# Patient Record
Sex: MALE | Race: OTHER | Employment: OTHER | URBAN - METROPOLITAN AREA
[De-identification: names, ages, dates, MRNs, and addresses within clinical notes are randomized per-mention and may not be internally consistent; named-entity substitution may affect disease eponyms.]

---

## 2017-03-06 ENCOUNTER — OFFICE VISIT (OUTPATIENT)
Dept: OTOLARYNGOLOGY | Facility: CLINIC | Age: 62
End: 2017-03-06

## 2017-03-06 ENCOUNTER — OFFICE VISIT (OUTPATIENT)
Dept: FAMILY MEDICINE CLINIC | Facility: CLINIC | Age: 62
End: 2017-03-06

## 2017-03-06 VITALS
DIASTOLIC BLOOD PRESSURE: 90 MMHG | SYSTOLIC BLOOD PRESSURE: 149 MMHG | RESPIRATION RATE: 16 BRPM | TEMPERATURE: 98 F | BODY MASS INDEX: 33 KG/M2 | HEART RATE: 69 BPM | WEIGHT: 195 LBS

## 2017-03-06 VITALS
BODY MASS INDEX: 33.29 KG/M2 | TEMPERATURE: 98 F | SYSTOLIC BLOOD PRESSURE: 152 MMHG | DIASTOLIC BLOOD PRESSURE: 96 MMHG | HEIGHT: 64 IN | HEART RATE: 70 BPM | WEIGHT: 195 LBS

## 2017-03-06 DIAGNOSIS — E78.5 HYPERLIPIDEMIA, UNSPECIFIED HYPERLIPIDEMIA TYPE: ICD-10-CM

## 2017-03-06 DIAGNOSIS — IMO0002 CYST OF NECK: Primary | ICD-10-CM

## 2017-03-06 DIAGNOSIS — R03.0 ELEVATED BLOOD PRESSURE READING: ICD-10-CM

## 2017-03-06 DIAGNOSIS — L02.11 ABSCESS, NECK: Primary | ICD-10-CM

## 2017-03-06 PROCEDURE — 99212 OFFICE O/P EST SF 10 MIN: CPT | Performed by: FAMILY MEDICINE

## 2017-03-06 PROCEDURE — 99212 OFFICE O/P EST SF 10 MIN: CPT | Performed by: OTOLARYNGOLOGY

## 2017-03-06 PROCEDURE — 99213 OFFICE O/P EST LOW 20 MIN: CPT | Performed by: FAMILY MEDICINE

## 2017-03-06 PROCEDURE — 99244 OFF/OP CNSLTJ NEW/EST MOD 40: CPT | Performed by: OTOLARYNGOLOGY

## 2017-03-06 RX ORDER — SIMVASTATIN 10 MG
10 TABLET ORAL NIGHTLY
Qty: 90 TABLET | Refills: 0 | Status: SHIPPED | OUTPATIENT
Start: 2017-03-06 | End: 2018-09-05

## 2017-03-06 RX ORDER — CLINDAMYCIN HYDROCHLORIDE 300 MG/1
300 CAPSULE ORAL 3 TIMES DAILY
Qty: 21 CAPSULE | Refills: 0 | Status: SHIPPED | OUTPATIENT
Start: 2017-03-06 | End: 2017-03-13

## 2017-03-06 NOTE — PROGRESS NOTES
HPI:Jose Rafael is a 59year old male who presents for lesion noted to right neck. Pt states he has had white lesion there for the past 2 years. On Friday, he felt pain in the area. Worsened over the weekend and size increased. No fevers.   No drainage from area

## 2017-03-10 ENCOUNTER — TELEPHONE (OUTPATIENT)
Dept: OTOLARYNGOLOGY | Facility: CLINIC | Age: 62
End: 2017-03-10

## 2017-03-10 NOTE — TELEPHONE ENCOUNTER
Pt's wife states that pt is experiencing pain. (pain level 8/10) on ear cyst and it is getting bigger and turning darker, \"blackish color\" pt requesting to spk to Rn right away. Please call, thank you. LOV: 03/06 ( unable to connect) pharmacy confirmed.

## 2017-03-10 NOTE — TELEPHONE ENCOUNTER
Per , may take time for antibiotic to penetrate cyst, pt to use warm compresses, cant take OTC tylenol, no NSAID or aspirin as pt is scheduled for surgery in 10 days.  Pt informed of  recommendations and advised to contact our office if symp

## 2017-03-12 ENCOUNTER — HOSPITAL ENCOUNTER (EMERGENCY)
Facility: HOSPITAL | Age: 62
Discharge: HOME | End: 2017-03-12
Attending: EMERGENCY MEDICINE
Payer: COMMERCIAL

## 2017-03-12 VITALS
TEMPERATURE: 98 F | WEIGHT: 190 LBS | SYSTOLIC BLOOD PRESSURE: 156 MMHG | OXYGEN SATURATION: 98 % | DIASTOLIC BLOOD PRESSURE: 105 MMHG | HEART RATE: 76 BPM | HEIGHT: 64 IN | RESPIRATION RATE: 18 BRPM | BODY MASS INDEX: 32.44 KG/M2

## 2017-03-12 DIAGNOSIS — L02.91 ABSCESS: Primary | ICD-10-CM

## 2017-03-12 PROCEDURE — 99283 EMERGENCY DEPT VISIT LOW MDM: CPT

## 2017-03-12 PROCEDURE — 10061 I&D ABSCESS COMP/MULTIPLE: CPT

## 2017-03-12 RX ORDER — HYDROCODONE BITARTRATE AND ACETAMINOPHEN 5; 325 MG/1; MG/1
2 TABLET ORAL ONCE
Status: COMPLETED | OUTPATIENT
Start: 2017-03-12 | End: 2017-03-12

## 2017-03-12 RX ORDER — SULFAMETHOXAZOLE AND TRIMETHOPRIM 800; 160 MG/1; MG/1
1 TABLET ORAL 2 TIMES DAILY
Qty: 20 TABLET | Refills: 0 | Status: SHIPPED | OUTPATIENT
Start: 2017-03-12 | End: 2017-03-22

## 2017-03-13 NOTE — ED NOTES
Patient to [facility] from triage c/o cyst to right side of neck x3wks. Scheduled for surgery on 3/22 with Dr. Levine April, but instructed to come to ED if he notices drainage, which began today. Rates pain 9/10. Denies additional complaints.   In NAD at this mark anthony

## 2017-03-13 NOTE — ED PROVIDER NOTES
Patient Seen in: [facility] Emergency Department    History   Patient presents with:  Cyst    Stated Complaint: cyst on right side of neck    HPI    59year old male 59year old Male with painful swelling of right facial cyst for 2 day(s).  has assoc Constitutional and vital signs reviewed. All other systems reviewed and negative except as noted above. PSFH elements reviewed from today and agreed except as otherwise stated in HPI.     Physical Exam     ED Triage Vitals   BP 03/12/17 2234 148/89 Imaging Results Available and Reviewed while in ED: No orders to display    ED Medications Administered: Medications - No data to display      Procedures: Incision and Drainage: The abscess was anesthetized with lidocaine.  A transverse incision was perform Plan: incised, drained and packed. Warm water soaks. Will add bactrim. F.u ent 2 days for wound check and packing removal.  Further Outpatient evaluation and treatment will be required.  I personally discussed the results of the above ED workup and a number

## 2017-03-13 NOTE — ED INITIAL ASSESSMENT (HPI)
Pt c/o cyst to right side of neck. Seen at pmd for same, told to return with drainage, which started today. Informed he would likely need sx to site.

## 2017-03-14 ENCOUNTER — OFFICE VISIT (OUTPATIENT)
Dept: OTOLARYNGOLOGY | Facility: CLINIC | Age: 62
End: 2017-03-14

## 2017-03-14 VITALS — SYSTOLIC BLOOD PRESSURE: 142 MMHG | HEART RATE: 74 BPM | DIASTOLIC BLOOD PRESSURE: 90 MMHG

## 2017-03-14 DIAGNOSIS — IMO0002 CYST OF NECK: Primary | ICD-10-CM

## 2017-03-14 PROCEDURE — 99212 OFFICE O/P EST SF 10 MIN: CPT | Performed by: OTOLARYNGOLOGY

## 2017-03-14 PROCEDURE — 99214 OFFICE O/P EST MOD 30 MIN: CPT | Performed by: OTOLARYNGOLOGY

## 2017-03-14 NOTE — PROGRESS NOTES
Trudy Bangura is a 59year old male.   Patient presents with:  Cyst: f/u neck cyst,pt seen in [address] [place] [address] ED , cyst was draining, pt states was advised by ED to follow up as it may need to be drained again, pt prescribed different antibiiotic       HISTORY OF PRESE History    TONSILLECTOMY  1971    REPAIR ROTATOR CUFF,CHRONIC Right 2011    Comment rotator cuff repair. 1999 rotator cuff tear (right).   Manuel Salvador         REVIEW OF SYSTEMS    System Neg/Pos Details   Constitutional Negative Fatigue, fever and weight loss Lips/teeth/gums - Normal. Tonsils - Normal. Oropharynx - Normal.   Nose/Mouth/Throat Normal Nares - Right: Normal Left: Normal. Septum -Normal  Turbinates - Right: Normal, Left: Normal.       Current outpatient prescriptions:   •  Sulfamethoxazole-TMP DS 8

## 2017-03-22 ENCOUNTER — LAB REQUISITION (OUTPATIENT)
Dept: LAB | Facility: HOSPITAL | Age: 62
End: 2017-03-22
Payer: COMMERCIAL

## 2017-03-22 DIAGNOSIS — Z01.89 ENCOUNTER FOR OTHER SPECIFIED SPECIAL EXAMINATIONS: ICD-10-CM

## 2017-03-22 PROCEDURE — 88305 TISSUE EXAM BY PATHOLOGIST: CPT | Performed by: OTOLARYNGOLOGY

## 2017-03-22 PROCEDURE — 88304 TISSUE EXAM BY PATHOLOGIST: CPT | Performed by: OTOLARYNGOLOGY

## 2017-03-23 ENCOUNTER — TELEPHONE (OUTPATIENT)
Dept: OTOLARYNGOLOGY | Facility: CLINIC | Age: 62
End: 2017-03-23

## 2017-03-23 NOTE — TELEPHONE ENCOUNTER
Pt is post op day 1 right neck excision and closure. Per pt is doing well, dressing is dry and intact and pt taking antibiotic as prescribed and per pt no pain.  Advised pt that if symptoms change or worsen such as drainage, increased redness or swelling or

## 2017-03-30 ENCOUNTER — OFFICE VISIT (OUTPATIENT)
Dept: OTOLARYNGOLOGY | Facility: CLINIC | Age: 62
End: 2017-03-30

## 2017-03-30 VITALS — WEIGHT: 185 LBS | TEMPERATURE: 98 F | HEIGHT: 64 IN | BODY MASS INDEX: 31.58 KG/M2

## 2017-03-30 DIAGNOSIS — IMO0002 CYST OF NECK: Primary | ICD-10-CM

## 2017-03-30 PROCEDURE — 99024 POSTOP FOLLOW-UP VISIT: CPT | Performed by: OTOLARYNGOLOGY

## 2017-03-30 PROCEDURE — 99212 OFFICE O/P EST SF 10 MIN: CPT | Performed by: OTOLARYNGOLOGY

## 2017-03-30 NOTE — PROGRESS NOTES
Chris Jefferson is a 59year old male. Patient presents with:  Post-Op      HISTORY OF PRESENT ILLNESS    2 year history of enlarging right upper neck cyst that has grown tremendously in the past few weeks with associated pain and and erythema.  No drainage of neck      3/22/17 Right Neck Cyst           Past Surgical History    TONSILLECTOMY  1971    REPAIR ROTATOR CUFF,CHRONIC Right 2011    Comment rotator cuff repair. 1999 rotator cuff tear (right).   Leanna Sero    EXC SKIN BENIG 2.1-3CM REMAINDR BODY Right 3 Detail Normal Submental. Submandibular. Anterior cervical. Posterior cervical. Supraclavicular.         Nose/Mouth/Throat Normal External nose - Normal. Lips/teeth/gums - Normal. Tonsils - Normal. Oropharynx - Normal.   Nose/Mouth/Throat Normal Nares - Righ

## 2018-07-23 ENCOUNTER — TELEPHONE (OUTPATIENT)
Dept: OTHER | Age: 63
End: 2018-07-23

## 2018-07-23 NOTE — TELEPHONE ENCOUNTER
Spouse would like to schedule appt for pt as he has been having mood swings, repeating conversations, forgetfulness. Concerned because pt has family history of brain hemorrhages. Pt is currently visiting family in [place] and returning 8/1/18.  Spouse

## 2018-08-02 ENCOUNTER — OFFICE VISIT (OUTPATIENT)
Dept: FAMILY MEDICINE CLINIC | Facility: CLINIC | Age: 63
End: 2018-08-02
Payer: MEDICARE

## 2018-08-02 ENCOUNTER — HOSPITAL ENCOUNTER (OUTPATIENT)
Dept: GENERAL RADIOLOGY | Age: 63
Discharge: HOME | End: 2018-08-02
Attending: FAMILY MEDICINE | Admitting: FAMILY MEDICINE
Payer: COMMERCIAL

## 2018-08-02 VITALS
DIASTOLIC BLOOD PRESSURE: 93 MMHG | TEMPERATURE: 97 F | WEIGHT: 194 LBS | BODY MASS INDEX: 33 KG/M2 | RESPIRATION RATE: 18 BRPM | SYSTOLIC BLOOD PRESSURE: 157 MMHG | HEART RATE: 71 BPM

## 2018-08-02 DIAGNOSIS — R05.9 COUGH: ICD-10-CM

## 2018-08-02 DIAGNOSIS — Z00.00 ROUTINE PHYSICAL EXAMINATION: ICD-10-CM

## 2018-08-02 DIAGNOSIS — R05.9 COUGH: Primary | ICD-10-CM

## 2018-08-02 DIAGNOSIS — R41.3 MEMORY LOSS: ICD-10-CM

## 2018-08-02 DIAGNOSIS — Z82.49 FAMILY HISTORY OF ANEURYSM: ICD-10-CM

## 2018-08-02 PROCEDURE — G0463 HOSPITAL OUTPT CLINIC VISIT: HCPCS | Performed by: FAMILY MEDICINE

## 2018-08-02 PROCEDURE — 71046 X-RAY EXAM CHEST 2 VIEWS: CPT | Performed by: FAMILY MEDICINE

## 2018-08-02 PROCEDURE — 99214 OFFICE O/P EST MOD 30 MIN: CPT | Performed by: FAMILY MEDICINE

## 2018-08-06 ENCOUNTER — TELEPHONE (OUTPATIENT)
Dept: OTHER | Age: 63
End: 2018-08-06

## 2018-08-06 NOTE — TELEPHONE ENCOUNTER
Frederic Reyes from [facility] care transfered wife's call to triage dept due to wife statement of pts' mental status. MRI not completed because clinical note not complete. Wife hung up call before the transfer.      I called pt directly, stated no issues besides the

## 2018-08-09 ENCOUNTER — TELEPHONE (OUTPATIENT)
Dept: FAMILY MEDICINE CLINIC | Facility: CLINIC | Age: 63
End: 2018-08-09

## 2018-08-09 NOTE — TELEPHONE ENCOUNTER
Spoke with Parminder Pina, only MRA is not passing Medicare coding. CMW please advise if there is something besides memory loss and family hx of aneurysm. Or would you like to proceed with MRI first to see if there is any known diagnosis.

## 2018-08-09 NOTE — TELEPHONE ENCOUNTER
[facility] Scheduling stts MRA Brain DX is not passing for medicare. Please advise on other DX code.

## 2018-08-10 NOTE — TELEPHONE ENCOUNTER
I would definitely proceed with the MRI. I am not sure why the MRA isn't passing because he has a significant family history of aneurysm. Could I do a peer to peer?

## 2018-08-11 NOTE — TELEPHONE ENCOUNTER
This message came from scheduling using Medicare guidelines. Medicare is listed as secondary policy, could request PA from [employer] PPO. LMTCB to clarify with pt which policy is primary.

## 2018-08-11 NOTE — TELEPHONE ENCOUNTER
Pt called back stating that the BC[employer] PPO is the primary insurance through his wife's company. Please advise.

## 2018-08-20 ENCOUNTER — HOSPITAL ENCOUNTER (OUTPATIENT)
Dept: MRI IMAGING | Facility: HOSPITAL | Age: 63
Discharge: HOME | End: 2018-08-20
Attending: FAMILY MEDICINE
Payer: COMMERCIAL

## 2018-08-20 ENCOUNTER — TELEPHONE (OUTPATIENT)
Dept: FAMILY MEDICINE CLINIC | Facility: CLINIC | Age: 63
End: 2018-08-20

## 2018-08-20 DIAGNOSIS — Z82.49 FAMILY HISTORY OF ANEURYSM: ICD-10-CM

## 2018-08-20 DIAGNOSIS — R41.3 MEMORY LOSS: ICD-10-CM

## 2018-08-20 PROCEDURE — 70551 MRI BRAIN STEM W/O DYE: CPT | Performed by: FAMILY MEDICINE

## 2018-08-20 PROCEDURE — 70544 MR ANGIOGRAPHY HEAD W/O DYE: CPT | Performed by: FAMILY MEDICINE

## 2018-08-20 NOTE — TELEPHONE ENCOUNTER
Savita/Gary Atkins called in stating that Pt is there to be seen for his MRI/MRA. While she runs Pt's insurance the MRA is not approving. She is asking for a new order with a DX code that would be approved through Medicare as secondary. Please advise.

## 2018-08-22 ENCOUNTER — PATIENT MESSAGE (OUTPATIENT)
Dept: FAMILY MEDICINE CLINIC | Facility: CLINIC | Age: 63
End: 2018-08-22

## 2018-08-23 ENCOUNTER — TELEPHONE (OUTPATIENT)
Dept: FAMILY MEDICINE CLINIC | Facility: CLINIC | Age: 63
End: 2018-08-23

## 2018-08-23 NOTE — TELEPHONE ENCOUNTER
From: Iam Rizzo  To: Li Perez DO  Sent: 8/22/2018 10:31 AM CDT  Subject: Test Results Question    Good morning Dr Rakesh Henderson   I was just wondering if you have received the MRI/MRA results. Thanks, have a great day.   Dylan Chew

## 2018-08-24 DIAGNOSIS — R41.3 MEMORY CHANGES: Primary | ICD-10-CM

## 2018-08-24 DIAGNOSIS — R90.89 ABNORMAL MRA, BRAIN: ICD-10-CM

## 2018-08-24 NOTE — TELEPHONE ENCOUNTER
Spoke with patient who is requesting to know the results for the MRI performed on 8/20/18. Message routed to covering provider for review.     Please reply to meryl: BRIANNA Hernandez

## 2018-08-28 ENCOUNTER — APPOINTMENT (OUTPATIENT)
Dept: LAB | Age: 63
End: 2018-08-28
Attending: FAMILY MEDICINE
Payer: MEDICARE

## 2018-08-28 DIAGNOSIS — Z00.00 ROUTINE PHYSICAL EXAMINATION: ICD-10-CM

## 2018-08-28 LAB
ALBUMIN SERPL BCP-MCNC: 4.1 G/DL (ref 3.5–4.8)
ALBUMIN/GLOB SERPL: 1.5 {RATIO} (ref 1–2)
ALP SERPL-CCNC: 42 U/L (ref 32–100)
ALT SERPL-CCNC: 24 U/L (ref 17–63)
ANION GAP SERPL CALC-SCNC: 7 MMOL/L (ref 0–18)
AST SERPL-CCNC: 20 U/L (ref 15–41)
BILIRUB SERPL-MCNC: 1.1 MG/DL (ref 0.3–1.2)
BUN SERPL-MCNC: 16 MG/DL (ref 8–20)
BUN/CREAT SERPL: 14.5 (ref 10–20)
CALCIUM SERPL-MCNC: 8.8 MG/DL (ref 8.5–10.5)
CHLORIDE SERPL-SCNC: 104 MMOL/L (ref 95–110)
CHOLEST SERPL-MCNC: 226 MG/DL (ref 110–200)
CO2 SERPL-SCNC: 26 MMOL/L (ref 22–32)
CREAT SERPL-MCNC: 1.1 MG/DL (ref 0.5–1.5)
ERYTHROCYTE [DISTWIDTH] IN BLOOD BY AUTOMATED COUNT: 13.2 % (ref 11–15)
GLOBULIN PLAS-MCNC: 2.7 G/DL (ref 2.5–3.7)
GLUCOSE SERPL-MCNC: 99 MG/DL (ref 70–99)
HCT VFR BLD AUTO: 47.2 % (ref 41–52)
HDLC SERPL-MCNC: 40 MG/DL
HGB BLD-MCNC: 15.6 G/DL (ref 13.5–17.5)
LDLC SERPL CALC-MCNC: 155 MG/DL (ref 0–99)
MCH RBC QN AUTO: 30.6 PG (ref 27–32)
MCHC RBC AUTO-ENTMCNC: 33.1 G/DL (ref 32–37)
MCV RBC AUTO: 92.5 FL (ref 80–100)
NONHDLC SERPL-MCNC: 186 MG/DL
OSMOLALITY UR CALC.SUM OF ELEC: 285 MOSM/KG (ref 275–295)
PATIENT FASTING: YES
PLATELET # BLD AUTO: 166 K/UL (ref 140–400)
PMV BLD AUTO: 9.1 FL (ref 7.4–10.3)
POTASSIUM SERPL-SCNC: 3.5 MMOL/L (ref 3.3–5.1)
PROT SERPL-MCNC: 6.8 G/DL (ref 5.9–8.4)
PSA SERPL-MCNC: 0.5 NG/ML (ref 0–4)
RBC # BLD AUTO: 5.1 M/UL (ref 4.5–5.9)
SODIUM SERPL-SCNC: 137 MMOL/L (ref 136–144)
TRIGL SERPL-MCNC: 154 MG/DL (ref 1–149)
TSH SERPL-ACNC: 2.61 UIU/ML (ref 0.45–5.33)
WBC # BLD AUTO: 5.7 K/UL (ref 4–11)

## 2018-08-28 PROCEDURE — 84443 ASSAY THYROID STIM HORMONE: CPT

## 2018-08-28 PROCEDURE — 36415 COLL VENOUS BLD VENIPUNCTURE: CPT

## 2018-08-28 PROCEDURE — 85027 COMPLETE CBC AUTOMATED: CPT

## 2018-08-28 PROCEDURE — 82306 VITAMIN D 25 HYDROXY: CPT

## 2018-08-28 PROCEDURE — 80061 LIPID PANEL: CPT

## 2018-08-28 PROCEDURE — 80053 COMPREHEN METABOLIC PANEL: CPT

## 2018-08-29 LAB — 25(OH)D3 SERPL-MCNC: 23.7 NG/ML

## 2018-08-31 DIAGNOSIS — E55.9 VITAMIN D DEFICIENCY: Primary | ICD-10-CM

## 2018-08-31 RX ORDER — ERGOCALCIFEROL 1.25 MG/1
50000 CAPSULE ORAL WEEKLY
Qty: 8 CAPSULE | Refills: 0 | Status: SHIPPED | OUTPATIENT
Start: 2018-08-31 | End: 2018-09-30

## 2018-09-05 ENCOUNTER — OFFICE VISIT (OUTPATIENT)
Dept: FAMILY MEDICINE CLINIC | Facility: CLINIC | Age: 63
End: 2018-09-05
Payer: COMMERCIAL

## 2018-09-05 VITALS
HEART RATE: 88 BPM | DIASTOLIC BLOOD PRESSURE: 94 MMHG | TEMPERATURE: 98 F | WEIGHT: 186 LBS | HEIGHT: 63 IN | BODY MASS INDEX: 32.96 KG/M2 | RESPIRATION RATE: 18 BRPM | SYSTOLIC BLOOD PRESSURE: 156 MMHG

## 2018-09-05 DIAGNOSIS — E55.9 VITAMIN D DEFICIENCY: ICD-10-CM

## 2018-09-05 DIAGNOSIS — R41.3 MEMORY LOSS: ICD-10-CM

## 2018-09-05 DIAGNOSIS — R05.9 COUGH: ICD-10-CM

## 2018-09-05 DIAGNOSIS — E78.5 HYPERLIPIDEMIA, UNSPECIFIED HYPERLIPIDEMIA TYPE: ICD-10-CM

## 2018-09-05 DIAGNOSIS — Z00.00 ROUTINE PHYSICAL EXAMINATION: Primary | ICD-10-CM

## 2018-09-05 PROCEDURE — 99397 PER PM REEVAL EST PAT 65+ YR: CPT | Performed by: FAMILY MEDICINE

## 2018-09-05 RX ORDER — SIMVASTATIN 20 MG
20 TABLET ORAL NIGHTLY
Qty: 90 TABLET | Refills: 1 | Status: SHIPPED | OUTPATIENT
Start: 2018-09-05 | End: 2019-01-21

## 2018-09-05 RX ORDER — ALBUTEROL SULFATE 90 UG/1
2 AEROSOL, METERED RESPIRATORY (INHALATION) EVERY 4 HOURS PRN
Qty: 1 INHALER | Refills: 0 | Status: SHIPPED | OUTPATIENT
Start: 2018-09-05 | End: 2019-09-25

## 2018-09-05 NOTE — PROGRESS NOTES
HPI:  70 yr old male who presents for physical. Has appt with Neurologist on 9/18. States symptoms are fine but wife complains he is still experiencing memory loss and mood changes. BP elevated today. States it is much lower at home. SBP 120s/80s.  L Neck supple. Good ROM. No LAD.   Thyroid normal.  CV:  Regular rate and rhythm; no murmurs  Lungs:  Clear to ausculation; good aeration               No wheezes, rales or rhonchi  Abd: soft, non-tender, non-distended          Normal bowel sounds; no mass

## 2018-09-18 ENCOUNTER — TELEPHONE (OUTPATIENT)
Dept: NEUROLOGY | Facility: CLINIC | Age: 63
End: 2018-09-18

## 2018-09-18 ENCOUNTER — APPOINTMENT (OUTPATIENT)
Dept: LAB | Facility: HOSPITAL | Age: 63
End: 2018-09-18
Attending: Other
Payer: COMMERCIAL

## 2018-09-18 ENCOUNTER — MED REC SCAN ONLY (OUTPATIENT)
Dept: NEUROLOGY | Facility: CLINIC | Age: 63
End: 2018-09-18

## 2018-09-18 ENCOUNTER — OFFICE VISIT (OUTPATIENT)
Dept: NEUROLOGY | Facility: CLINIC | Age: 63
End: 2018-09-18
Payer: COMMERCIAL

## 2018-09-18 VITALS
HEIGHT: 64 IN | HEART RATE: 74 BPM | DIASTOLIC BLOOD PRESSURE: 80 MMHG | WEIGHT: 182 LBS | BODY MASS INDEX: 31.07 KG/M2 | SYSTOLIC BLOOD PRESSURE: 120 MMHG

## 2018-09-18 DIAGNOSIS — R41.3 MEMORY LOSS: Primary | ICD-10-CM

## 2018-09-18 DIAGNOSIS — R41.3 MEMORY LOSS: ICD-10-CM

## 2018-09-18 LAB
FOLATE SERPL-MCNC: 6.2 NG/ML
TSH SERPL-ACNC: 2.84 UIU/ML (ref 0.45–5.33)
VIT B12 SERPL-MCNC: 245 PG/ML (ref 181–914)

## 2018-09-18 PROCEDURE — 82746 ASSAY OF FOLIC ACID SERUM: CPT

## 2018-09-18 PROCEDURE — 82607 VITAMIN B-12: CPT

## 2018-09-18 PROCEDURE — 86780 TREPONEMA PALLIDUM: CPT

## 2018-09-18 PROCEDURE — 84443 ASSAY THYROID STIM HORMONE: CPT

## 2018-09-18 PROCEDURE — 36415 COLL VENOUS BLD VENIPUNCTURE: CPT

## 2018-09-18 PROCEDURE — 87389 HIV-1 AG W/HIV-1&-2 AB AG IA: CPT

## 2018-09-18 PROCEDURE — 99204 OFFICE O/P NEW MOD 45 MIN: CPT | Performed by: OTHER

## 2018-09-18 NOTE — PROGRESS NOTES
Neurology Initial Visit     Referred By: Dr. Hwang ref. provider found    Chief Complaint: Patient presents with:  Neurologic Problem: referred by Dr Bharath Somers pt is here for consult on repetitive went talking for 3 months.  Had MRI and MRA brain 8-20-18 result occasionally       Drug use: No       Family History   Problem Relation Age of Onset   • Other (brain aneurysm) Father 49        47 onset (cause of death)   • Colon Cancer Cousin 64        onset 64 (cause of death)   • Heart Disease Mother 72        CAD, C symmetric, no facial weakness  VIII. Hearing intact to whisper, tuning fork or finger rub. IX. Pallet elevates symmetrically. XI. Shoulder shrug is intact  XII.  Tongue is midline    Motor Exam:  Muscle tone normal  No atrophy or fasciculations  Strength- EXTERNAL  - VITAMIN B12; Future  - TSH W REFLEX TO FREE T4; Future  - T PALLIDUM SCREENING CASCADE; Future           Education and counseling provided to patient.  Instructed patient to call my office or seek medical attention immediately if symptoms worsen

## 2018-09-18 NOTE — TELEPHONE ENCOUNTER
L/m advising Pt. insurance was verified and Neuropsych testing cpt code 85925  is a covered benefit and does not require authorization. Can proceed with scheduling appt.

## 2018-09-19 ENCOUNTER — TELEPHONE (OUTPATIENT)
Dept: NEUROLOGY | Facility: CLINIC | Age: 63
End: 2018-09-19

## 2018-09-19 LAB
HIV1+2 AB SERPL QL IA: NONREACTIVE
T PALLIDUM AB SER QL: NEGATIVE

## 2018-09-19 NOTE — TELEPHONE ENCOUNTER
----- Message from Veronica Mai MD sent at 9/19/2018  8:12 AM CDT -----  Please let the patient know that results of these particular lab tests so far were normal.    Thank you

## 2018-09-19 NOTE — TELEPHONE ENCOUNTER
----- Message from Danica Corey MD sent at 9/19/2018  8:12 AM CDT -----  Please let the patient know that results of these particular lab tests so far were normal.    Thank you

## 2018-10-10 ENCOUNTER — TELEPHONE (OUTPATIENT)
Dept: NEUROLOGY | Facility: CLINIC | Age: 63
End: 2018-10-10

## 2018-10-10 ENCOUNTER — OFFICE VISIT (OUTPATIENT)
Dept: NEUROLOGY | Facility: CLINIC | Age: 63
End: 2018-10-10
Payer: COMMERCIAL

## 2018-10-10 VITALS
BODY MASS INDEX: 30.56 KG/M2 | WEIGHT: 179 LBS | HEIGHT: 64 IN | DIASTOLIC BLOOD PRESSURE: 88 MMHG | SYSTOLIC BLOOD PRESSURE: 142 MMHG | HEART RATE: 90 BPM

## 2018-10-10 DIAGNOSIS — R41.3 MEMORY LOSS: Primary | ICD-10-CM

## 2018-10-10 PROCEDURE — 99214 OFFICE O/P EST MOD 30 MIN: CPT | Performed by: OTHER

## 2018-10-10 NOTE — PROGRESS NOTES
Neurology Follow up Visit     Referred By: Dr. Hwang ref. provider found    Chief Complaint: Patient presents with:  Memory Loss: LOV 9-18-18 pt is here to f/u on memory loss and neuropshych test result.  Pt states memory have being the same since last offic rotator cuff 1999    rotator cuff tear (right). 2011 Rotator cuff repair       Past Surgical History:   Procedure Laterality Date   • EXC SKIN BENIG 2.1-3CM REMAINDR BODY Right 3/22/17   • REPAIR ROTATOR CUFF,CHRONIC Right 2011    rotator cuff repair.   21 age    Language intact including: comprehension, naming, repetition, vocabulary    MoCA was done and was 23/30 and showed deficiencies in delayed recall.       Cranial Nerves:  II.- Visual fields full to confrontation        Fundoscopically- No papilledema 1. 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   No obvious memory loss itself, which makes Alzheimer's disease unlikely, but still some possibility of frontotemp

## 2018-10-11 ENCOUNTER — MED REC SCAN ONLY (OUTPATIENT)
Dept: NEUROLOGY | Facility: CLINIC | Age: 63
End: 2018-10-11

## 2019-01-16 ENCOUNTER — NURSE TRIAGE (OUTPATIENT)
Dept: OTHER | Age: 64
End: 2019-01-16

## 2019-01-16 NOTE — TELEPHONE ENCOUNTER
Reviewed doctor's recommendations with pt, does not want to go to ER.  States he will keep appt as scheduled Monday 1/21/19

## 2019-01-16 NOTE — TELEPHONE ENCOUNTER
Action Requested: Summary for Provider     []  Critical Lab, Recommendations Needed  [] Need Additional Advice  []   FYI    []   Need Orders  [] Need Medications Sent to Pharmacy  []  Other     SUMMARY: Dr Tonia Sánchez, please advise.  Per protocol, advised to go

## 2019-01-21 ENCOUNTER — OFFICE VISIT (OUTPATIENT)
Dept: FAMILY MEDICINE CLINIC | Facility: CLINIC | Age: 64
End: 2019-01-21
Payer: COMMERCIAL

## 2019-01-21 VITALS
HEART RATE: 71 BPM | SYSTOLIC BLOOD PRESSURE: 153 MMHG | OXYGEN SATURATION: 98 % | BODY MASS INDEX: 32 KG/M2 | WEIGHT: 189 LBS | RESPIRATION RATE: 18 BRPM | DIASTOLIC BLOOD PRESSURE: 91 MMHG | TEMPERATURE: 97 F

## 2019-01-21 DIAGNOSIS — E78.5 HYPERLIPIDEMIA, UNSPECIFIED HYPERLIPIDEMIA TYPE: ICD-10-CM

## 2019-01-21 DIAGNOSIS — R07.89 CHEST TIGHTNESS OR PRESSURE: Primary | ICD-10-CM

## 2019-01-21 DIAGNOSIS — I10 ESSENTIAL HYPERTENSION: ICD-10-CM

## 2019-01-21 PROCEDURE — 99214 OFFICE O/P EST MOD 30 MIN: CPT | Performed by: FAMILY MEDICINE

## 2019-01-21 PROCEDURE — 99212 OFFICE O/P EST SF 10 MIN: CPT | Performed by: FAMILY MEDICINE

## 2019-01-21 PROCEDURE — G0463 HOSPITAL OUTPT CLINIC VISIT: HCPCS | Performed by: FAMILY MEDICINE

## 2019-01-21 PROCEDURE — 93005 ELECTROCARDIOGRAM TRACING: CPT | Performed by: FAMILY MEDICINE

## 2019-01-21 PROCEDURE — 93000 ELECTROCARDIOGRAM COMPLETE: CPT | Performed by: FAMILY MEDICINE

## 2019-01-21 RX ORDER — SIMVASTATIN 20 MG
20 TABLET ORAL NIGHTLY
Qty: 90 TABLET | Refills: 1 | Status: SHIPPED | OUTPATIENT
Start: 2019-01-21 | End: 2019-08-19

## 2019-01-21 RX ORDER — HYDROCHLOROTHIAZIDE 12.5 MG/1
12.5 TABLET ORAL DAILY
Qty: 30 TABLET | Refills: 3 | Status: SHIPPED | OUTPATIENT
Start: 2019-01-21 | End: 2019-07-10

## 2019-01-21 NOTE — PROGRESS NOTES
HPI: Sherri Finley is a 70year old male who presents for chest pressure. Intermittent. Happens at rest or activity. No increased pain with activity. Goes to the gym and shoveled snow without worsening symptoms. Lasts seconds. No associated symptoms.   Wife state

## 2019-02-21 ENCOUNTER — OFFICE VISIT (OUTPATIENT)
Dept: FAMILY MEDICINE CLINIC | Facility: CLINIC | Age: 64
End: 2019-02-21
Payer: COMMERCIAL

## 2019-02-21 VITALS
WEIGHT: 192 LBS | HEART RATE: 72 BPM | DIASTOLIC BLOOD PRESSURE: 90 MMHG | OXYGEN SATURATION: 96 % | BODY MASS INDEX: 33 KG/M2 | RESPIRATION RATE: 18 BRPM | SYSTOLIC BLOOD PRESSURE: 152 MMHG | TEMPERATURE: 98 F

## 2019-02-21 DIAGNOSIS — R05.9 COUGH: Primary | ICD-10-CM

## 2019-02-21 DIAGNOSIS — I10 ESSENTIAL HYPERTENSION: ICD-10-CM

## 2019-02-21 PROCEDURE — 99212 OFFICE O/P EST SF 10 MIN: CPT | Performed by: FAMILY MEDICINE

## 2019-02-21 PROCEDURE — 99214 OFFICE O/P EST MOD 30 MIN: CPT | Performed by: FAMILY MEDICINE

## 2019-02-21 PROCEDURE — G0463 HOSPITAL OUTPT CLINIC VISIT: HCPCS | Performed by: FAMILY MEDICINE

## 2019-02-21 NOTE — PROGRESS NOTES
HPI: Justen Kim is a 70year old male who presents for follow-up HTN. States BP is often lower at home. States it never gets as high as in the office. Still having cough and chest congestion.  Has had cough since August.  He states now he has congestion as we

## 2019-03-06 ENCOUNTER — TELEPHONE (OUTPATIENT)
Dept: FAMILY MEDICINE CLINIC | Facility: CLINIC | Age: 64
End: 2019-03-06

## 2019-03-06 NOTE — TELEPHONE ENCOUNTER
Pt states was referred to Dr Mayte Yang by [facility]. States Dr Mayte Yang has no openings available until 4/29/19 and is wondering if can see a different pulmonologist who might be able to go over the PFT results with him before then?     Please respond to pool: BRIANNA  OPO

## 2019-03-13 ENCOUNTER — HOSPITAL ENCOUNTER (OUTPATIENT)
Dept: RESPIRATORY THERAPY | Facility: HOSPITAL | Age: 64
Discharge: HOME | End: 2019-03-13
Attending: FAMILY MEDICINE
Payer: COMMERCIAL

## 2019-03-13 ENCOUNTER — HOSPITAL ENCOUNTER (OUTPATIENT)
Dept: CT IMAGING | Facility: HOSPITAL | Age: 64
Discharge: HOME | End: 2019-03-13
Attending: FAMILY MEDICINE
Payer: COMMERCIAL

## 2019-03-13 DIAGNOSIS — R05.9 COUGH: ICD-10-CM

## 2019-03-13 LAB — CREAT BLD-MCNC: 0.9 MG/DL (ref 0.5–1.5)

## 2019-03-13 PROCEDURE — 71260 CT THORAX DX C+: CPT | Performed by: FAMILY MEDICINE

## 2019-03-13 PROCEDURE — 94726 PLETHYSMOGRAPHY LUNG VOLUMES: CPT | Performed by: INTERNAL MEDICINE

## 2019-03-13 PROCEDURE — 94729 DIFFUSING CAPACITY: CPT | Performed by: INTERNAL MEDICINE

## 2019-03-13 PROCEDURE — 82565 ASSAY OF CREATININE: CPT

## 2019-03-13 PROCEDURE — 94060 EVALUATION OF WHEEZING: CPT | Performed by: INTERNAL MEDICINE

## 2019-03-18 NOTE — PROCEDURES
[facility]    Patient's Name Jaren Castaneda MRN [id number]    1955 Pulmonologist Caryle Parsons, MD   Location [facility] PCP Gavin Norman DO     IMPRESSION:    The PFTs are Normal.    There is no signific

## 2019-03-18 NOTE — ADDENDUM NOTE
Encounter addended by: Kyle Benedict MD on: 3/17/2019 7:38 PM   Actions taken: Sign clinical note, Charge Capture section accepted

## 2019-03-22 ENCOUNTER — TELEPHONE (OUTPATIENT)
Dept: FAMILY MEDICINE CLINIC | Facility: CLINIC | Age: 64
End: 2019-03-22

## 2019-03-22 NOTE — TELEPHONE ENCOUNTER
Pt called in stating that he was advised by the hospital that an MRI of the abdomen was recommended after he received his CT scan. Pt stated that he would like to have this order completed prior to seeing the Specialist that CMW referred him to.    Pt is r

## 2019-03-23 NOTE — TELEPHONE ENCOUNTER
LM - Dr Rodrigo Sandoval is out of the office until April 1. Pt's Numari message has been already forwarded to her. Can pt wait for MRI order until Dr Rodrigo Sandoval comes back?

## 2019-03-24 ENCOUNTER — PATIENT MESSAGE (OUTPATIENT)
Dept: FAMILY MEDICINE CLINIC | Facility: CLINIC | Age: 64
End: 2019-03-24

## 2019-03-25 NOTE — TELEPHONE ENCOUNTER
From: Stephanie Middleton  To: Donavan Knight DO  Sent: 3/24/2019 8:14 PM CDT  Subject: Referral Request    Hi Dr Tiffanie Gaona  I received a message about you being out of this week. If it is ok, can you please order these there upon your return?   Thank you  [facility]

## 2019-04-03 ENCOUNTER — TELEPHONE (OUTPATIENT)
Dept: FAMILY MEDICINE CLINIC | Facility: CLINIC | Age: 64
End: 2019-04-03

## 2019-04-03 NOTE — TELEPHONE ENCOUNTER
Patient called about MRI abdomen, need to see if insurance had approved. Advised patient to call his insurance about its authorization. Also gave him phone # of Managed Care. Patient asked for phone # for Dr Cyril Reyna.  Gave patient Dr Adeola Wheeler phone # (refer

## 2019-04-11 ENCOUNTER — HOSPITAL ENCOUNTER (OUTPATIENT)
Dept: MRI IMAGING | Facility: HOSPITAL | Age: 64
Discharge: HOME | End: 2019-04-11
Attending: FAMILY MEDICINE
Payer: COMMERCIAL

## 2019-04-11 ENCOUNTER — OFFICE VISIT (OUTPATIENT)
Dept: PULMONOLOGY | Facility: CLINIC | Age: 64
End: 2019-04-11
Payer: MEDICARE

## 2019-04-11 VITALS
SYSTOLIC BLOOD PRESSURE: 150 MMHG | OXYGEN SATURATION: 98 % | WEIGHT: 194 LBS | DIASTOLIC BLOOD PRESSURE: 92 MMHG | HEART RATE: 65 BPM | HEIGHT: 64 IN | BODY MASS INDEX: 33.12 KG/M2

## 2019-04-11 DIAGNOSIS — G47.10 HYPERSOMNIA: Primary | ICD-10-CM

## 2019-04-11 DIAGNOSIS — N28.9 RENAL LESION: ICD-10-CM

## 2019-04-11 DIAGNOSIS — K76.9 HEPATIC LESION: ICD-10-CM

## 2019-04-11 PROCEDURE — 99213 OFFICE O/P EST LOW 20 MIN: CPT | Performed by: INTERNAL MEDICINE

## 2019-04-11 PROCEDURE — 82565 ASSAY OF CREATININE: CPT

## 2019-04-11 PROCEDURE — G0463 HOSPITAL OUTPT CLINIC VISIT: HCPCS | Performed by: INTERNAL MEDICINE

## 2019-04-11 PROCEDURE — 74183 MRI ABD W/O CNTR FLWD CNTR: CPT | Performed by: FAMILY MEDICINE

## 2019-04-11 PROCEDURE — A9575 INJ GADOTERATE MEGLUMI 0.1ML: HCPCS | Performed by: FAMILY MEDICINE

## 2019-04-11 NOTE — H&P
Referring Physician  Tracey Orozco DO    Chief Complaint  Cough    History of Present Illness  Patient presents today for initial visit to pulmonary clinic. Over the course of last several months he admits to some remittent nonproductive cough.   He stat Oral Tab Take 1 tablet (20 mg total) by mouth nightly. Disp: 90 tablet Rfl: 1   hydrochlorothiazide 12.5 MG Oral Tab Take 1 tablet (12.5 mg total) by mouth daily. Disp: 30 tablet Rfl: 3   aspirin 81 MG Oral Tab Take 81 mg by mouth daily.  Disp:  Rfl:    Alb

## 2019-05-13 NOTE — H&P
[address] [facility] [address] Gastroenterology                                                                                                  Clinic History and Physical     Pa 2011 Rotator cuff repair      Past Surgical History:   Procedure Laterality Date   • COLONOSCOPY  2003 then every 5 years   • EXC SKIN BENIG 2.1-3CM REMAINDR BODY Right 3/22/17   • REPAIR ROTATOR CUFF,CHRONIC Right 2011    rotator cuff repair.   1999 cecy negative for jaundice   ALLERGIC/IMMUNOLOGIC:  +allergies   ENDOCRINE:  negative for cold intolerance and heat intolerance  MUSCULOSKELETAL:  negative for joint effusion/severe erythema  BEHAVIOR/PSYCH:  negative for psychotic behavior    PHYSICAL EXAM: NO alcohol, recreational drugs nor erectile dysfunction mediations 24 hours before procedure(s)   - NO herbal supplements or weight loss medications x 7 days prior to the procedure(s)    ** If MAC @ [facility] or IV twilight - continue all medications as prescrib

## 2019-05-14 ENCOUNTER — OFFICE VISIT (OUTPATIENT)
Dept: GASTROENTEROLOGY | Facility: CLINIC | Age: 64
End: 2019-05-14
Payer: MEDICARE

## 2019-05-14 ENCOUNTER — TELEPHONE (OUTPATIENT)
Dept: GASTROENTEROLOGY | Facility: CLINIC | Age: 64
End: 2019-05-14

## 2019-05-14 VITALS
BODY MASS INDEX: 32.78 KG/M2 | WEIGHT: 192 LBS | DIASTOLIC BLOOD PRESSURE: 83 MMHG | HEIGHT: 64 IN | HEART RATE: 76 BPM | SYSTOLIC BLOOD PRESSURE: 138 MMHG

## 2019-05-14 DIAGNOSIS — Z86.010 HISTORY OF COLON POLYPS: ICD-10-CM

## 2019-05-14 DIAGNOSIS — Z12.11 ENCOUNTER FOR SCREENING COLONOSCOPY: Primary | ICD-10-CM

## 2019-05-14 DIAGNOSIS — Z12.11 SCREEN FOR COLON CANCER: Primary | ICD-10-CM

## 2019-05-14 DIAGNOSIS — Z86.010 PERSONAL HISTORY OF COLONIC POLYPS: ICD-10-CM

## 2019-05-14 PROCEDURE — G0463 HOSPITAL OUTPT CLINIC VISIT: HCPCS | Performed by: PHYSICIAN ASSISTANT

## 2019-05-14 PROCEDURE — 99203 OFFICE O/P NEW LOW 30 MIN: CPT | Performed by: PHYSICIAN ASSISTANT

## 2019-05-14 RX ORDER — POLYETHYLENE GLYCOL 3350, SODIUM CHLORIDE, SODIUM BICARBONATE, POTASSIUM CHLORIDE 420; 11.2; 5.72; 1.48 G/4L; G/4L; G/4L; G/4L
POWDER, FOR SOLUTION ORAL
Qty: 1 BOTTLE | Refills: 0 | Status: ON HOLD | OUTPATIENT
Start: 2019-05-14 | End: 2019-06-24 | Stop reason: ALTCHOICE

## 2019-05-14 NOTE — PATIENT INSTRUCTIONS
1. Schedule colonoscopy with Dr. Dimitri Dance with IV or MAC anesthesia @ [facility] or [facility].  - Monday/Tuesday    2.  bowel prep from pharmacy - I have prescribed Trilyte split dose preparation.      3. Medication Changes:    ** If MAC @ [facility]/[place]:    - HOL

## 2019-05-21 ENCOUNTER — TELEPHONE (OUTPATIENT)
Dept: PULMONOLOGY | Facility: CLINIC | Age: 64
End: 2019-05-21

## 2019-05-21 DIAGNOSIS — G47.10 HYPERSOMNIA: Primary | ICD-10-CM

## 2019-05-21 NOTE — TELEPHONE ENCOUNTER
Patients insurance denied the auth request for in lab sleep study as not being medically necessary. A peer to peer can be done by contacting [phone] with reference to case# [id number]. An order can also be placed for a home sleep study if you agree.

## 2019-05-22 NOTE — TELEPHONE ENCOUNTER
You may let the patient know that in lab sleep study was not approved so I will order home sleep study. You may provide patient details.   We will contact him after results are back from home sleep study

## 2019-05-23 NOTE — TELEPHONE ENCOUNTER
HARDEEP Whitfield. Per Alan/Sleep center pt in lab sleep study denied due to Medically Necessity and clinical notes provided. Per [employer] pt will benefit from home sleep study. Pt informed of order and voiced understanding.  Pt already has sleep center number

## 2019-06-22 ENCOUNTER — HOSPITAL ENCOUNTER (OUTPATIENT)
Age: 64
Discharge: HOME | End: 2019-06-22
Attending: EMERGENCY MEDICINE
Payer: COMMERCIAL

## 2019-06-22 VITALS
RESPIRATION RATE: 18 BRPM | HEIGHT: 64 IN | SYSTOLIC BLOOD PRESSURE: 139 MMHG | OXYGEN SATURATION: 97 % | HEART RATE: 87 BPM | WEIGHT: 190 LBS | DIASTOLIC BLOOD PRESSURE: 83 MMHG | BODY MASS INDEX: 32.44 KG/M2 | TEMPERATURE: 98 F

## 2019-06-22 DIAGNOSIS — S66.902A INJURY OF EXTENSOR TENDON OF LEFT HAND, INITIAL ENCOUNTER: ICD-10-CM

## 2019-06-22 DIAGNOSIS — S61.012A LACERATION OF LEFT THUMB WITHOUT FOREIGN BODY WITHOUT DAMAGE TO NAIL, INITIAL ENCOUNTER: Primary | ICD-10-CM

## 2019-06-22 PROCEDURE — 12001 RPR S/N/AX/GEN/TRNK 2.5CM/<: CPT

## 2019-06-22 PROCEDURE — 99213 OFFICE O/P EST LOW 20 MIN: CPT

## 2019-06-22 PROCEDURE — 99212 OFFICE O/P EST SF 10 MIN: CPT

## 2019-06-22 NOTE — ED NOTES
Pt discharged home stable and in good condition by self. Reviewed pain, wound care and avs. Follow up as indicated. Pt verbalized understanding and agreed.

## 2019-06-22 NOTE — ED PROVIDER NOTES
Patient Seen in: [address]    History   Patient presents with:  Laceration Abrasion (integumentary)    Stated Complaint: LT thumb injury    HPI    58-year-old male patient presents with a laceration to the dorsal aspect 86.2 kg   SpO2 97%   BMI 32.61 kg/m²         Physical Exam    General: Well-appearing, in no apparent distress  HEENT: Clear oropharynx, PERRL, hearing intact  Neck: Supple, no meningismus  Chest: Normal respiratory effort  Cardiovascular: Regular rate and

## 2019-06-22 NOTE — ED INITIAL ASSESSMENT (HPI)
Pt in IC by self c/o laceration to left thumb from utility knife today. TDAP last given 6-7 years ago.

## 2019-06-24 ENCOUNTER — TELEPHONE (OUTPATIENT)
Dept: FAMILY MEDICINE CLINIC | Facility: CLINIC | Age: 64
End: 2019-06-24

## 2019-06-24 ENCOUNTER — HOSPITAL ENCOUNTER (OUTPATIENT)
Facility: HOSPITAL | Age: 64
Setting detail: HOSPITAL OUTPATIENT SURGERY
Discharge: HOME | End: 2019-06-24
Attending: INTERNAL MEDICINE | Admitting: INTERNAL MEDICINE
Payer: COMMERCIAL

## 2019-06-24 ENCOUNTER — TELEPHONE (OUTPATIENT)
Dept: GASTROENTEROLOGY | Facility: CLINIC | Age: 64
End: 2019-06-24

## 2019-06-24 VITALS
OXYGEN SATURATION: 100 % | HEIGHT: 64 IN | HEART RATE: 62 BPM | SYSTOLIC BLOOD PRESSURE: 131 MMHG | DIASTOLIC BLOOD PRESSURE: 84 MMHG | WEIGHT: 190 LBS | BODY MASS INDEX: 32.44 KG/M2 | RESPIRATION RATE: 13 BRPM

## 2019-06-24 DIAGNOSIS — Z12.11 SCREEN FOR COLON CANCER: ICD-10-CM

## 2019-06-24 DIAGNOSIS — Z86.010 HISTORY OF COLON POLYPS: ICD-10-CM

## 2019-06-24 PROCEDURE — 45378 DIAGNOSTIC COLONOSCOPY: CPT | Performed by: INTERNAL MEDICINE

## 2019-06-24 PROCEDURE — 0DJD8ZZ INSPECTION OF LOWER INTESTINAL TRACT, VIA NATURAL OR ARTIFICIAL OPENING ENDOSCOPIC: ICD-10-PCS | Performed by: INTERNAL MEDICINE

## 2019-06-24 PROCEDURE — G0500 MOD SEDAT ENDO SERVICE >5YRS: HCPCS | Performed by: INTERNAL MEDICINE

## 2019-06-24 RX ORDER — SODIUM CHLORIDE, SODIUM LACTATE, POTASSIUM CHLORIDE, CALCIUM CHLORIDE 600; 310; 30; 20 MG/100ML; MG/100ML; MG/100ML; MG/100ML
INJECTION, SOLUTION INTRAVENOUS CONTINUOUS
Status: DISCONTINUED | OUTPATIENT
Start: 2019-06-24 | End: 2019-06-24

## 2019-06-24 RX ORDER — SODIUM CHLORIDE 0.9 % (FLUSH) 0.9 %
10 SYRINGE (ML) INJECTION AS NEEDED
Status: DISCONTINUED | OUTPATIENT
Start: 2019-06-24 | End: 2019-06-24

## 2019-06-24 RX ORDER — MIDAZOLAM HYDROCHLORIDE 1 MG/ML
1 INJECTION INTRAMUSCULAR; INTRAVENOUS EVERY 5 MIN PRN
Status: DISCONTINUED | OUTPATIENT
Start: 2019-06-24 | End: 2019-06-24

## 2019-06-24 RX ORDER — MIDAZOLAM HYDROCHLORIDE 1 MG/ML
INJECTION INTRAMUSCULAR; INTRAVENOUS
Status: DISCONTINUED | OUTPATIENT
Start: 2019-06-24 | End: 2019-06-24

## 2019-06-24 NOTE — TELEPHONE ENCOUNTER
GI Staff:    Recall patient for colonoscopy with IV/conscious sedation in 6 months for colorectal cancer screening and history of colon polyps for split trilyte prep.  He completed the 4L the night before the procedure today with inadequate bowel prep of th

## 2019-06-24 NOTE — OPERATIVE REPORT
Colonoscopy Report    Chris Ronny     1955 Age 70year old   PCP Siria Olivas DO Endoscopist Maryellen Vickers MD     Date of procedure: 19    Procedure: Colonoscopy    Pre-operative diagnosis: colorectal cancer screening, history of col solid retained stool unable to be cleared with aggressive suctioning and washing. Starting from the mid transverse colon and extending distally, the bowel prep was fair but adequate with ability to see the mucosa. Withdrawal time was 26 minutes.     Air was

## 2019-06-24 NOTE — TELEPHONE ENCOUNTER
Entered into Epic:Recall colon in 6 months per Dr. Jacoby Riley. Next due for Colon done 12/24/19.  Per Dr. Jacoby Riley:     \"Recall patient for colonoscopy with IV/conscious sedation in 6 months for colorectal cancer screening and history of colon polyps for split trilyte p

## 2019-06-24 NOTE — TELEPHONE ENCOUNTER
Spouse following up (JAYDA) reports pt was seen in UC 6/22/19 for a laceration to his left thumb. Currently has stitches. Was recommended to schedule follow up with PCP to re-evaluate the wound.  Follow up appt scheduled with Dr Evy Schroeder 6/27 10:15am, was not able

## 2019-06-24 NOTE — H&P
History & Physical Examination    Patient Name: Trudy Bangura  MRN: [id number]  [facility]: [id number]  YOB: 1955    Diagnosis: colorectal cancer screening; history of colon polyps      Medications Prior to Admission:  simvastatin 20 MG Oral Tab Take onset 64 (cause of death)   • Heart Disease Mother 72        CAD, CABG x4, onset @ 72   • Rash Sister         scleroderma   • Lipids Other         hyperlipidemia, family h/o   • Cancer Paternal Grandmother         She  from it     Social History    Tob

## 2019-06-27 ENCOUNTER — OFFICE VISIT (OUTPATIENT)
Dept: FAMILY MEDICINE CLINIC | Facility: CLINIC | Age: 64
End: 2019-06-27
Payer: MEDICARE

## 2019-06-27 VITALS
SYSTOLIC BLOOD PRESSURE: 166 MMHG | BODY MASS INDEX: 32 KG/M2 | TEMPERATURE: 98 F | WEIGHT: 186 LBS | HEART RATE: 74 BPM | DIASTOLIC BLOOD PRESSURE: 99 MMHG | RESPIRATION RATE: 18 BRPM

## 2019-06-27 DIAGNOSIS — Z48.02 VISIT FOR SUTURE REMOVAL: Primary | ICD-10-CM

## 2019-06-27 PROCEDURE — G0463 HOSPITAL OUTPT CLINIC VISIT: HCPCS | Performed by: FAMILY MEDICINE

## 2019-06-27 PROCEDURE — 99213 OFFICE O/P EST LOW 20 MIN: CPT | Performed by: FAMILY MEDICINE

## 2019-06-27 NOTE — PROGRESS NOTES
HPI: [facility] is a 70year old male who presents for left thumb injury. He was cutting meat and sliced thumb. Happened on 6/22, 5 days ago. Went to ER and had 2 stitches placed. No bleeding, redness, drainage. No pain. No change in range of motion.  DAVIS docto

## 2019-07-10 RX ORDER — HYDROCHLOROTHIAZIDE 12.5 MG/1
CAPSULE, GELATIN COATED ORAL
Qty: 90 CAPSULE | Refills: 1 | Status: SHIPPED | OUTPATIENT
Start: 2019-07-10 | End: 2020-01-21

## 2019-07-10 NOTE — TELEPHONE ENCOUNTER
Refill passed per [facility] protocol.   Hypertensive Medications  Protocol Criteria:  · Appointment scheduled in the past 6 months or in the next 3 months  · BMP or CMP in the past 12 months  · Creatinine result < 2  Recent Outpatient Visits

## 2019-08-22 RX ORDER — SIMVASTATIN 20 MG
TABLET ORAL
Qty: 90 TABLET | Refills: 1 | Status: SHIPPED | OUTPATIENT
Start: 2019-08-22 | End: 2020-05-27

## 2019-08-22 NOTE — TELEPHONE ENCOUNTER
Please review; protocol failed.     Requested Prescriptions     Pending Prescriptions Disp Refills   • SIMVASTATIN 20 MG Oral Tab [Pharmacy Med Name: Simvastatin Oral Tablet 20 MG] 90 tablet 0     Sig: TAKE ONE TABLET BY MOUTH ONE TIME DAILY         Recent

## 2019-09-25 ENCOUNTER — TELEPHONE (OUTPATIENT)
Dept: NEUROLOGY | Facility: CLINIC | Age: 64
End: 2019-09-25

## 2019-09-25 NOTE — TELEPHONE ENCOUNTER
Pt. informed insurance was verified and neuro psyche testing cp codes [id number], [id number], [id number], [id number]. is a covered benefit and does not require authorization. He will call to schedule appt. Piter Mejía

## 2019-09-25 NOTE — PROGRESS NOTES
Neurology Follow up Visit     Referred By: Dr. Hwang ref. provider found    Chief Complaint: Patient presents with:  Memory Loss: Patient here for follow up for memory loss with wife.  Patient's wife state the patient has had increasing personality changes w becoming more difficult to control. He snaps very easily, over small things. Patient himself did not feel there was any significant change in his behavior.     Past Medical History:   Diagnosis Date   • Colon polyp 2003    Pathology came back negative   • Exam:   09/25/19  0752   BP: 120/70   Pulse: 80   Resp: 16   Weight: 185 lb   Height: 64\"       General: No apparent distress, well nourished, well groomed.   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   No obvious memory loss itself, which makes Alzheimer's disease unlikely, but still some possibility of fron

## 2019-09-26 ENCOUNTER — MED REC SCAN ONLY (OUTPATIENT)
Dept: NEUROLOGY | Facility: CLINIC | Age: 64
End: 2019-09-26

## 2019-10-01 ENCOUNTER — TELEPHONE (OUTPATIENT)
Dept: GASTROENTEROLOGY | Facility: CLINIC | Age: 64
End: 2019-10-01

## 2019-10-01 NOTE — TELEPHONE ENCOUNTER
----- Message from Tushar Garcia RN sent at 2019 11:59 AM CDT -----  Regardin month CLN recall  Entered into Epic:Recall colon in 6 months per Dr. Debra Oswald. Next due for Colon done 19.  Per Dr. Debra Oswald:     \"Recall patient for colonoscopy with IV/cons

## 2019-10-30 ENCOUNTER — IMMUNIZATION (OUTPATIENT)
Dept: FAMILY MEDICINE CLINIC | Facility: CLINIC | Age: 64
End: 2019-10-30
Payer: MEDICARE

## 2019-10-30 DIAGNOSIS — Z23 NEED FOR VACCINATION: ICD-10-CM

## 2019-10-30 PROCEDURE — 90686 IIV4 VACC NO PRSV 0.5 ML IM: CPT | Performed by: FAMILY MEDICINE

## 2019-10-30 PROCEDURE — G0008 ADMIN INFLUENZA VIRUS VAC: HCPCS | Performed by: FAMILY MEDICINE

## 2019-11-20 NOTE — PROGRESS NOTES
Neurology Follow up Visit     Referred By: Dr. Hwang ref. provider found    Chief Complaint: Patient presents with:  Memory Loss: Patient is here to follow up after Neurophych test. Was done 11/11/19. LOV 09/25/19 he states that he is doing well.       HPI: did not feel there was any significant change in his behavior. Therefore at that point neuropsychological testing was done. However no change from the prior baseline noted.   It seems that most of his anger issues were related to him worrying about his wi Rfl: 1  •  aspirin 81 MG Oral Tab, Take 81 mg by mouth daily. , Disp: , Rfl:     No Known Allergies    ROS:   As in HPI, the rest of the 14 system review was done and was negative      Physical Exam:   11/20/19  1109   BP: 132/76   Pulse: 76   Resp: 20   We reviewed imaging. MRI of the brain was independently reviewed, no obvious significant abnormalities        Assessment   1.  Memory loss  Patient with some loss of cognitive and executive function, possible sequela of multiple head injuries while playing so

## 2019-12-17 ENCOUNTER — TELEPHONE (OUTPATIENT)
Dept: GASTROENTEROLOGY | Facility: CLINIC | Age: 64
End: 2019-12-17

## 2019-12-17 DIAGNOSIS — Z86.010 HISTORY OF COLON POLYPS: ICD-10-CM

## 2019-12-17 DIAGNOSIS — Z12.11 COLON CANCER SCREENING: Primary | ICD-10-CM

## 2019-12-18 RX ORDER — POLYETHYLENE GLYCOL 3350, SODIUM CHLORIDE, SODIUM BICARBONATE, POTASSIUM CHLORIDE 420; 11.2; 5.72; 1.48 G/4L; G/4L; G/4L; G/4L
POWDER, FOR SOLUTION ORAL
Qty: 1 BOTTLE | Refills: 0 | Status: ON HOLD | OUTPATIENT
Start: 2019-12-18 | End: 2020-01-03

## 2019-12-18 NOTE — TELEPHONE ENCOUNTER
Dr. Nicholas Dey--    Please order this patients Trilyte prep which I did explain to him and informed him to also  1 bottle of mag citrate which he verbally understood.  Thank you    You may close this TE when done :)

## 2019-12-18 NOTE — TELEPHONE ENCOUNTER
Please schedule the patient with IV/conscious sedation at the hospital preferably the first Friday of the month (can be starting at 7:30) if needed    Diagnosis is colorectal cancer screening and history of colon polyps    Please go over split 4 L trilyte

## 2019-12-18 NOTE — TELEPHONE ENCOUNTER
Last Procedure, Date, MD:  SAURAV damian/ Dr. Britney Marrufo on 6/24/2019  Last Diagnosis:  Impression  1. Inadequate bowel preparation of the right colon with multiple areas of large solid retained stool unable to be cleared with aggressive washing and suctioning  2.  Small n

## 2020-01-03 ENCOUNTER — HOSPITAL ENCOUNTER (OUTPATIENT)
Facility: HOSPITAL | Age: 65
Setting detail: HOSPITAL OUTPATIENT SURGERY
Discharge: HOME | End: 2020-01-03
Attending: INTERNAL MEDICINE | Admitting: INTERNAL MEDICINE
Payer: MEDICARE

## 2020-01-03 DIAGNOSIS — Z86.010 HISTORY OF COLON POLYPS: ICD-10-CM

## 2020-01-03 DIAGNOSIS — Z12.11 COLON CANCER SCREENING: ICD-10-CM

## 2020-01-03 PROCEDURE — 0DBN8ZX EXCISION OF SIGMOID COLON, VIA NATURAL OR ARTIFICIAL OPENING ENDOSCOPIC, DIAGNOSTIC: ICD-10-PCS | Performed by: INTERNAL MEDICINE

## 2020-01-03 PROCEDURE — 0DBP8ZX EXCISION OF RECTUM, VIA NATURAL OR ARTIFICIAL OPENING ENDOSCOPIC, DIAGNOSTIC: ICD-10-PCS | Performed by: INTERNAL MEDICINE

## 2020-01-03 PROCEDURE — 45385 COLONOSCOPY W/LESION REMOVAL: CPT | Performed by: INTERNAL MEDICINE

## 2020-01-03 PROCEDURE — 45380 COLONOSCOPY AND BIOPSY: CPT | Performed by: INTERNAL MEDICINE

## 2020-01-03 RX ORDER — DIPHENHYDRAMINE HYDROCHLORIDE 50 MG/ML
INJECTION INTRAMUSCULAR; INTRAVENOUS
Status: DISCONTINUED | OUTPATIENT
Start: 2020-01-03 | End: 2020-01-03

## 2020-01-03 RX ORDER — SODIUM CHLORIDE 0.9 % (FLUSH) 0.9 %
10 SYRINGE (ML) INJECTION AS NEEDED
Status: DISCONTINUED | OUTPATIENT
Start: 2020-01-03 | End: 2020-01-03

## 2020-01-03 RX ORDER — SODIUM CHLORIDE, SODIUM LACTATE, POTASSIUM CHLORIDE, CALCIUM CHLORIDE 600; 310; 30; 20 MG/100ML; MG/100ML; MG/100ML; MG/100ML
INJECTION, SOLUTION INTRAVENOUS CONTINUOUS
Status: DISCONTINUED | OUTPATIENT
Start: 2020-01-03 | End: 2020-01-03

## 2020-01-03 RX ORDER — MIDAZOLAM HYDROCHLORIDE 1 MG/ML
1 INJECTION INTRAMUSCULAR; INTRAVENOUS EVERY 5 MIN PRN
Status: DISCONTINUED | OUTPATIENT
Start: 2020-01-03 | End: 2020-01-03

## 2020-01-03 RX ORDER — MIDAZOLAM HYDROCHLORIDE 1 MG/ML
INJECTION INTRAMUSCULAR; INTRAVENOUS
Status: DISCONTINUED | OUTPATIENT
Start: 2020-01-03 | End: 2020-01-03

## 2020-01-03 NOTE — H&P
History & Physical Examination    Patient Name: Sujey Gonzalez  MRN: [id number]  CSN: [id number]  YOB: 1955    Diagnosis: colorectal cancer screening, history of colon polyps    SIMVASTATIN 20 MG Oral Tab, TAKE ONE TABLET BY MOUTH ONE TIME Sandra Osborne (cause of death)   • Heart Disease Mother 72        CAD, CABG x4, onset @ 72   • Rash Sister         scleroderma   • Lipids Other         hyperlipidemia, family h/o   • Cancer Paternal Grandmother         She  from it     Social History    Tobacco Use

## 2020-01-03 NOTE — OPERATIVE REPORT
Colonoscopy Report    Daniel Juan     1955 Age 75year old   PCP Mikayla Hayes DO Endoscopist Goyo Henson MD     Date of procedure: 20    Procedure: Colonoscopy w/ biopsy and snare polypectomy    Pre-operative diagnosis: colorectal washing. Withdrawal time was 16 minutes. Air was then withdrawn and the endoscope was removed. The patient tolerated the procedure well. There were no immediate postoperative complications.  The patient’s vital signs were monitored throughout the procedu [phone].     Alexus Cadena MD  [facility], [facility] - Gastroenterology  1/3/2020

## 2020-01-04 VITALS
OXYGEN SATURATION: 94 % | BODY MASS INDEX: 31.58 KG/M2 | SYSTOLIC BLOOD PRESSURE: 113 MMHG | HEIGHT: 64 IN | RESPIRATION RATE: 14 BRPM | DIASTOLIC BLOOD PRESSURE: 83 MMHG | WEIGHT: 185 LBS | HEART RATE: 64 BPM

## 2020-01-08 ENCOUNTER — TELEPHONE (OUTPATIENT)
Dept: GASTROENTEROLOGY | Facility: CLINIC | Age: 65
End: 2020-01-08

## 2020-01-22 RX ORDER — HYDROCHLOROTHIAZIDE 12.5 MG/1
CAPSULE, GELATIN COATED ORAL
Qty: 90 CAPSULE | Refills: 0 | Status: SHIPPED | OUTPATIENT
Start: 2020-01-22 | End: 2020-04-30

## 2020-01-22 NOTE — TELEPHONE ENCOUNTER
Appt request sent via [employer] [facility] please f/u, thanks    Please review; protocol failed.  D/t labs and appt  Requested Prescriptions     Pending Prescriptions Disp Refills   • HYDROCHLOROTHIAZIDE 12.5 MG Oral Cap [Pharmacy Med Name: hydroCHLOROthiazide Oral

## 2020-02-27 ENCOUNTER — TELEPHONE (OUTPATIENT)
Dept: FAMILY MEDICINE CLINIC | Facility: CLINIC | Age: 65
End: 2020-02-27

## 2020-02-27 DIAGNOSIS — Z00.00 ROUTINE PHYSICAL EXAMINATION: Primary | ICD-10-CM

## 2020-03-27 ENCOUNTER — APPOINTMENT (OUTPATIENT)
Dept: LAB | Facility: HOSPITAL | Age: 65
End: 2020-03-27
Attending: FAMILY MEDICINE
Payer: MEDICARE

## 2020-03-27 DIAGNOSIS — Z00.00 ROUTINE PHYSICAL EXAMINATION: ICD-10-CM

## 2020-03-27 LAB
ALBUMIN SERPL-MCNC: 3.5 G/DL (ref 3.4–5)
ALBUMIN/GLOB SERPL: 0.9 {RATIO} (ref 1–2)
ALP LIVER SERPL-CCNC: 48 U/L (ref 45–117)
ALT SERPL-CCNC: 29 U/L (ref 16–61)
ANION GAP SERPL CALC-SCNC: 2 MMOL/L (ref 0–18)
AST SERPL-CCNC: 16 U/L (ref 15–37)
BILIRUB SERPL-MCNC: 0.7 MG/DL (ref 0.1–2)
BUN BLD-MCNC: 19 MG/DL (ref 7–18)
BUN/CREAT SERPL: 17.6 (ref 10–20)
CALCIUM BLD-MCNC: 9.3 MG/DL (ref 8.5–10.1)
CHLORIDE SERPL-SCNC: 107 MMOL/L (ref 98–112)
CHOLEST SMN-MCNC: 138 MG/DL (ref ?–200)
CO2 SERPL-SCNC: 31 MMOL/L (ref 21–32)
CREAT BLD-MCNC: 1.08 MG/DL (ref 0.7–1.3)
DEPRECATED RDW RBC AUTO: 40.2 FL (ref 35.1–46.3)
ERYTHROCYTE [DISTWIDTH] IN BLOOD BY AUTOMATED COUNT: 12.1 % (ref 11–15)
GLOBULIN PLAS-MCNC: 4 G/DL (ref 2.8–4.4)
GLUCOSE BLD-MCNC: 99 MG/DL (ref 70–99)
HCT VFR BLD AUTO: 46.5 % (ref 39–53)
HDLC SERPL-MCNC: 47 MG/DL (ref 40–59)
HGB BLD-MCNC: 15.8 G/DL (ref 13–17.5)
LDLC SERPL CALC-MCNC: 69 MG/DL (ref ?–100)
M PROTEIN MFR SERPL ELPH: 7.5 G/DL (ref 6.4–8.2)
MCH RBC QN AUTO: 30.6 PG (ref 26–34)
MCHC RBC AUTO-ENTMCNC: 34 G/DL (ref 31–37)
MCV RBC AUTO: 90.1 FL (ref 80–100)
NONHDLC SERPL-MCNC: 91 MG/DL (ref ?–130)
OSMOLALITY SERPL CALC.SUM OF ELEC: 292 MOSM/KG (ref 275–295)
PATIENT FASTING Y/N/NP: YES
PATIENT FASTING Y/N/NP: YES
PLATELET # BLD AUTO: 201 10(3)UL (ref 150–450)
POTASSIUM SERPL-SCNC: 4.5 MMOL/L (ref 3.5–5.1)
RBC # BLD AUTO: 5.16 X10(6)UL (ref 3.8–5.8)
SODIUM SERPL-SCNC: 140 MMOL/L (ref 136–145)
TRIGL SERPL-MCNC: 109 MG/DL (ref 30–149)
TSI SER-ACNC: 3.54 MIU/ML (ref 0.36–3.74)
VLDLC SERPL CALC-MCNC: 22 MG/DL (ref 0–30)
WBC # BLD AUTO: 6.7 X10(3) UL (ref 4–11)

## 2020-03-27 PROCEDURE — 84443 ASSAY THYROID STIM HORMONE: CPT

## 2020-03-27 PROCEDURE — 80053 COMPREHEN METABOLIC PANEL: CPT

## 2020-03-27 PROCEDURE — 85027 COMPLETE CBC AUTOMATED: CPT

## 2020-03-27 PROCEDURE — 36415 COLL VENOUS BLD VENIPUNCTURE: CPT

## 2020-03-27 PROCEDURE — 80061 LIPID PANEL: CPT

## 2020-04-30 RX ORDER — HYDROCHLOROTHIAZIDE 12.5 MG/1
CAPSULE, GELATIN COATED ORAL
Qty: 90 CAPSULE | Refills: 0 | Status: SHIPPED | OUTPATIENT
Start: 2020-04-30 | End: 2020-08-03
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
## 2020-05-27 ENCOUNTER — TELEPHONE (OUTPATIENT)
Dept: FAMILY MEDICINE CLINIC | Facility: CLINIC | Age: 65
End: 2020-05-27

## 2020-05-27 RX ORDER — SIMVASTATIN 20 MG
TABLET ORAL
Qty: 90 TABLET | Refills: 0 | OUTPATIENT
Start: 2020-05-27

## 2020-05-27 RX ORDER — SIMVASTATIN 20 MG
20 TABLET ORAL DAILY
Qty: 90 TABLET | Refills: 1 | Status: SHIPPED | OUTPATIENT
Start: 2020-05-27 | End: 2020-11-21

## 2020-05-27 NOTE — TELEPHONE ENCOUNTER
Jose Rafael needs a refill of:    SIMVASTATIN 20 MG Oral Tab TAKE ONE TABLET BY MOUTH ONE TIME DAILY  90 tablet 1                                   I called Jose Rafael to reschedule his Medicare annual wellness exam to July. He states that he needs a refill of his Simvastatin, refilled to [facility]. Can that please be refilled? He rescheduled his appointment to July 13, 2020.

## 2020-05-28 NOTE — TELEPHONE ENCOUNTER
Called patient no answer left detailed message in regards to medication sent to the pharmacy. If any questions or concerns may give us a call back.

## 2020-07-13 ENCOUNTER — OFFICE VISIT (OUTPATIENT)
Dept: FAMILY MEDICINE CLINIC | Facility: CLINIC | Age: 65
End: 2020-07-13
Payer: MEDICARE

## 2020-07-13 VITALS
HEART RATE: 61 BPM | HEIGHT: 63 IN | RESPIRATION RATE: 18 BRPM | BODY MASS INDEX: 32.78 KG/M2 | SYSTOLIC BLOOD PRESSURE: 150 MMHG | WEIGHT: 185 LBS | TEMPERATURE: 98 F | DIASTOLIC BLOOD PRESSURE: 84 MMHG

## 2020-07-13 DIAGNOSIS — Z00.00 ENCOUNTER FOR ANNUAL HEALTH EXAMINATION: Primary | ICD-10-CM

## 2020-07-13 DIAGNOSIS — E78.5 HYPERLIPIDEMIA, UNSPECIFIED HYPERLIPIDEMIA TYPE: ICD-10-CM

## 2020-07-13 DIAGNOSIS — R41.3 MEMORY LOSS: ICD-10-CM

## 2020-07-13 DIAGNOSIS — I10 ESSENTIAL HYPERTENSION: ICD-10-CM

## 2020-07-13 DIAGNOSIS — Z86.010 HISTORY OF COLON POLYPS: ICD-10-CM

## 2020-07-13 PROCEDURE — G0439 PPPS, SUBSEQ VISIT: HCPCS | Performed by: FAMILY MEDICINE

## 2020-07-13 NOTE — PATIENT INSTRUCTIONS
[address] SCREENING SCHEDULE   Tests on this list are recommended by your physician but may not be covered, or covered at this frequency, by your insurer. Please check with your insurance carrier before scheduling to verify coverage.     PREVENTATIV abnormal Colonoscopy due on 01/03/2030 Update [facility] if applicable    Flex Sigmoidoscopy Screen  Covered every 5 years No results found for this or any previous visit. No flowsheet data found.      Fecal Occult Blood   Covered Annually No result Zoster (Not covered by Medicare Part B) No orders found for this or any previous visit. This may be covered with your pharmacy  prescription benefits     Recommended Websites for Advanced Directives    [email or web address]

## 2020-07-13 NOTE — PROGRESS NOTES
HPI:   Deondre Howard is a 77year old male who presents for a Medicare Subsequent Annual Wellness visit (Pt already had Initial Annual Wellness). 77 yr old male who presents for physical. Jogging every day.   Runs 3 miles per day for the past 2 month advance directives standard forms performed Face to Face with patient and Family/surrogate (if present), and forms available to patient in AVS                He has never smoked tobacco.    CAGE Alcohol screening   Zuly Bosworth was screened for Alcohol ab benig 2.1-3cm remaindr body (Right, 3/22/17); repr cmpl wnd head,fac,hand 2.6-7.5 (Right, 3/22/17); colonoscopy (2003 then every 5 years); colonoscopy (N/A, 6/24/2019); colonoscopy; and colonoscopy (N/A, 1/3/2020).     His family history includes Cancer in Hearing Screening    Screening Method:  Questionnaire  I have a problem hearing over the telephone:  No I have trouble following the conversations when two or more people are talking at the same time:  No   I have trouble understanding things on the TV: masses          No hepatosplenomegaly  Extremities: No cyanosis, clubbing, edema. Pedal pulses 2+ venus. MSK:  No abnormalities. Skin: Warm/dry/intact. No abnormal moles/lesions noted. No rashes. Psych: Orientated to person, place, and time.   Behavior External Lab or Procedure   Diabetes Screening      HbgA1C   Annually No results found for: A1C    No flowsheet data found.     Fasting Blood Sugar (FSB)Annually Glucose (mg/dL)   Date Value   03/27/2020 99       Cardiovascular Disease Screening     LDL [facility] covered by Medicare Part B No vaccine history found This may be covered with your pharmacy  prescription benefits      SPECIFIC DISEASE MONITORING Internal Lab or Procedure External Lab or Procedure      Annual Monitoring of Persistent     Medications (ACE

## 2020-08-03 RX ORDER — HYDROCHLOROTHIAZIDE 12.5 MG/1
CAPSULE, GELATIN COATED ORAL
Qty: 90 CAPSULE | Refills: 0 | Status: SHIPPED | OUTPATIENT
Start: 2020-08-03 | End: 2020-10-20 | Stop reason: DRUGHIGH

## 2020-08-26 ENCOUNTER — TELEMEDICINE (OUTPATIENT)
Dept: FAMILY MEDICINE CLINIC | Facility: CLINIC | Age: 65
End: 2020-08-26

## 2020-08-26 VITALS — SYSTOLIC BLOOD PRESSURE: 126 MMHG | DIASTOLIC BLOOD PRESSURE: 86 MMHG

## 2020-08-26 DIAGNOSIS — I10 ESSENTIAL HYPERTENSION: Primary | ICD-10-CM

## 2020-08-26 PROCEDURE — 99213 OFFICE O/P EST LOW 20 MIN: CPT | Performed by: FAMILY MEDICINE

## 2020-08-26 NOTE — PROGRESS NOTES
HPI: Siddhartha Crow is a 77year old male who presents for HTN follow-up. Has been checking blood pressure at home. Has been consistently between 130-140/80-82. Wife is checking blood pressure now on left arm. BP- 126/86.   Denies chest pain, SOB, palpitations, chase could be performed. Every conscious effort was taken to allow for sufficient and adequate time. This billing was spent on reviewing labs, medications, radiology tests and decision making.   Appropriate medical decision-making and tests are ordered as deta

## 2020-10-20 ENCOUNTER — OFFICE VISIT (OUTPATIENT)
Dept: FAMILY MEDICINE CLINIC | Facility: CLINIC | Age: 65
End: 2020-10-20
Payer: MEDICARE

## 2020-10-20 VITALS
HEART RATE: 69 BPM | TEMPERATURE: 96 F | DIASTOLIC BLOOD PRESSURE: 93 MMHG | SYSTOLIC BLOOD PRESSURE: 160 MMHG | RESPIRATION RATE: 16 BRPM

## 2020-10-20 DIAGNOSIS — I10 ESSENTIAL HYPERTENSION: Primary | ICD-10-CM

## 2020-10-20 PROCEDURE — 90472 IMMUNIZATION ADMIN EACH ADD: CPT | Performed by: FAMILY MEDICINE

## 2020-10-20 PROCEDURE — G0008 ADMIN INFLUENZA VIRUS VAC: HCPCS | Performed by: FAMILY MEDICINE

## 2020-10-20 PROCEDURE — 90662 IIV NO PRSV INCREASED AG IM: CPT | Performed by: FAMILY MEDICINE

## 2020-10-20 PROCEDURE — 90750 HZV VACC RECOMBINANT IM: CPT | Performed by: FAMILY MEDICINE

## 2020-10-20 PROCEDURE — 99213 OFFICE O/P EST LOW 20 MIN: CPT | Performed by: FAMILY MEDICINE

## 2020-10-20 RX ORDER — HYDROCHLOROTHIAZIDE 25 MG/1
25 TABLET ORAL DAILY
Qty: 90 TABLET | Refills: 1 | Status: SHIPPED | OUTPATIENT
Start: 2020-10-20 | End: 2021-04-19

## 2020-10-20 NOTE — PROGRESS NOTES
HPI: Nuria Carpenter is a 77year old male who presents for HTN follow-up. Has been checking blood pressure at home. BPs at home have been running high. Did not take medication this morning. Pt states he stopped running about a month ago. He was running daily.   Renata Heading

## 2020-11-21 RX ORDER — SIMVASTATIN 20 MG
TABLET ORAL
Qty: 90 TABLET | Refills: 0 | Status: SHIPPED | OUTPATIENT
Start: 2020-11-21 | End: 2021-04-19

## 2021-03-05 DIAGNOSIS — Z23 NEED FOR VACCINATION: ICD-10-CM

## 2021-04-19 RX ORDER — SIMVASTATIN 20 MG
TABLET ORAL
Qty: 90 TABLET | Refills: 0 | Status: SHIPPED | OUTPATIENT
Start: 2021-04-19 | End: 2021-08-03

## 2021-04-19 RX ORDER — HYDROCHLOROTHIAZIDE 25 MG/1
TABLET ORAL
Qty: 90 TABLET | Refills: 0 | Status: SHIPPED | OUTPATIENT
Start: 2021-04-19 | End: 2021-08-23

## 2021-04-21 ENCOUNTER — TELEPHONE (OUTPATIENT)
Dept: FAMILY MEDICINE CLINIC | Facility: CLINIC | Age: 66
End: 2021-04-21

## 2021-04-21 DIAGNOSIS — L60.9 NAIL ABNORMALITY: Primary | ICD-10-CM

## 2021-04-21 NOTE — TELEPHONE ENCOUNTER
Spoke with and relayed Dr. Bourgeois Argue message below--patient verbalizes understanding and agreement. Podiatry referral sent to patient via [employer] per his request. 5/10/2021 appt with Dr. Bharath Somers cancelled. No further questions/concerns at this time.

## 2021-04-21 NOTE — TELEPHONE ENCOUNTER
Spoke with patient ( verified) and RE: message below--patient denies pain, redness, drainage/pus to toenail--able to wear shoes/walk without discomfort. 'My wife was cutting my nails yesterday and she noticed the nail was not all together. \"    Breanne Cobian

## 2021-04-21 NOTE — TELEPHONE ENCOUNTER
Pt scheduled appt via [employer] 3 weeks out for 5/10 citing Nail on R toe is changing color and looks dead, please advise if patient is ok to wait

## 2021-05-19 ENCOUNTER — OFFICE VISIT (OUTPATIENT)
Dept: PODIATRY CLINIC | Facility: CLINIC | Age: 66
End: 2021-05-19
Payer: MEDICARE

## 2021-05-19 VITALS — BODY MASS INDEX: 32.78 KG/M2 | HEIGHT: 63 IN | WEIGHT: 185 LBS

## 2021-05-19 DIAGNOSIS — L60.3 NAIL DYSTROPHY: Primary | ICD-10-CM

## 2021-05-19 PROCEDURE — 99203 OFFICE O/P NEW LOW 30 MIN: CPT | Performed by: PODIATRIST

## 2021-05-19 NOTE — PROGRESS NOTES
HPI:    Patient ID: Taylor Infante is a 77year old male. Pleasant 38-year-old male presents as a new patient to me on referral from [facility]. Patient's primary concern is nail change on the right great toe.   He has noticed some separation and a bit of d prescriptions requested or ordered in this encounter       Imaging & Referrals:  None       SD#[id number]

## 2021-08-03 RX ORDER — SIMVASTATIN 20 MG
TABLET ORAL
Qty: 90 TABLET | Refills: 0 | Status: SHIPPED | OUTPATIENT
Start: 2021-08-03

## 2021-08-23 RX ORDER — HYDROCHLOROTHIAZIDE 25 MG/1
25 TABLET ORAL DAILY
Qty: 90 TABLET | Refills: 1 | Status: SHIPPED | OUTPATIENT
Start: 2021-08-23

## 2021-08-23 NOTE — TELEPHONE ENCOUNTER
Please review. Protocol Failed / No Protocol.     Requested Prescriptions   Pending Prescriptions Disp Refills    HYDROCHLOROTHIAZIDE 25 MG Oral Tab [Pharmacy Med Name: hydroCHLOROthiazide Oral Tablet 25 MG] 90 tablet 0     Sig: TAKE 1 TABLET BY MOUTH ONE

## 2022-02-11 RX ORDER — SIMVASTATIN 20 MG
20 TABLET ORAL DAILY
Qty: 90 TABLET | Refills: 0 | OUTPATIENT
Start: 2022-02-11

## 2022-02-12 NOTE — TELEPHONE ENCOUNTER
appointment   Message [id number]  From   Braden Aceves RN To   Roro Smith and Delivered   2/11/2022  9:34 PM   Last Read in [address]   2/11/2022 10:06 PM by Goyo Garrison

## 2022-02-12 NOTE — TELEPHONE ENCOUNTER
LOV 10/20/20. [employer] message sent. CSS=please call and assists. No future appointments. Please review; protocol failed/no protocol.      Requested Prescriptions   Pending Prescriptions Disp Refills    SIMVASTATIN 20 MG Oral Tab [Pharmacy Med Name: Simvastatin Oral Tablet 20 MG] 90 tablet 0     Sig: TAKE 1 TABLET BY MOUTH DAILY        Cholesterol Medication Protocol Failed - 2/11/2022  9:35 PM        Failed - ALT in past 12 months        Failed - LDL in past 12 months        Failed - Last ALT < 80       Lab Results   Component Value Date    ALT 29 03/27/2020             Failed - Last LDL < 130     Lab Results   Component Value Date    LDL 69 03/27/2020               Failed - Appointment in past 12 or next 3 months               Recent Outpatient Visits              8 months ago Nail dystrophy    [facility], [address], [address] [place] [address]    Office Visit    1 year ago Essential hypertension    [place] [facility], [place], [place] DO    Office Visit    1 year ago Essential hypertension    [facility], [place], [place] DO    Telemedicine    1 year ago Encounter for annual health examination    [place] [facility], [place], [place] DO    Office Visit    2 years ago Cognitive impairment    [facility] [place] Hidalgo MD    Office Visit

## 2022-03-15 ENCOUNTER — LAB ENCOUNTER (OUTPATIENT)
Dept: LAB | Age: 67
End: 2022-03-15
Attending: FAMILY MEDICINE
Payer: MEDICARE

## 2022-03-15 ENCOUNTER — OFFICE VISIT (OUTPATIENT)
Dept: FAMILY MEDICINE CLINIC | Facility: CLINIC | Age: 67
End: 2022-03-15
Payer: COMMERCIAL

## 2022-03-15 VITALS
TEMPERATURE: 98 F | SYSTOLIC BLOOD PRESSURE: 122 MMHG | BODY MASS INDEX: 32.43 KG/M2 | HEART RATE: 67 BPM | RESPIRATION RATE: 18 BRPM | DIASTOLIC BLOOD PRESSURE: 78 MMHG | HEIGHT: 63 IN | WEIGHT: 183 LBS

## 2022-03-15 DIAGNOSIS — E78.5 HYPERLIPIDEMIA, UNSPECIFIED HYPERLIPIDEMIA TYPE: ICD-10-CM

## 2022-03-15 DIAGNOSIS — Z00.00 ENCOUNTER FOR ANNUAL HEALTH EXAMINATION: ICD-10-CM

## 2022-03-15 DIAGNOSIS — Z00.00 ENCOUNTER FOR ANNUAL HEALTH EXAMINATION: Primary | ICD-10-CM

## 2022-03-15 DIAGNOSIS — I10 ESSENTIAL HYPERTENSION: ICD-10-CM

## 2022-03-15 DIAGNOSIS — Z86.010 HISTORY OF COLON POLYPS: ICD-10-CM

## 2022-03-15 LAB
ALBUMIN SERPL-MCNC: 4 G/DL (ref 3.4–5)
ALBUMIN/GLOB SERPL: 1.2 {RATIO} (ref 1–2)
ALP LIVER SERPL-CCNC: 43 U/L
ALT SERPL-CCNC: 29 U/L
ANION GAP SERPL CALC-SCNC: 5 MMOL/L (ref 0–18)
AST SERPL-CCNC: 18 U/L (ref 15–37)
BILIRUB SERPL-MCNC: 0.9 MG/DL (ref 0.1–2)
BILIRUB UR QL: NEGATIVE
BUN BLD-MCNC: 19 MG/DL (ref 7–18)
BUN/CREAT SERPL: 17.9 (ref 10–20)
CALCIUM BLD-MCNC: 9.3 MG/DL (ref 8.5–10.1)
CHLORIDE SERPL-SCNC: 103 MMOL/L (ref 98–112)
CHOLEST SERPL-MCNC: 162 MG/DL (ref ?–200)
CLARITY UR: CLEAR
CO2 SERPL-SCNC: 28 MMOL/L (ref 21–32)
COLOR UR: YELLOW
CREAT BLD-MCNC: 1.06 MG/DL
DEPRECATED RDW RBC AUTO: 44 FL (ref 35.1–46.3)
ERYTHROCYTE [DISTWIDTH] IN BLOOD BY AUTOMATED COUNT: 13.1 % (ref 11–15)
FASTING PATIENT LIPID ANSWER: YES
FASTING STATUS PATIENT QL REPORTED: YES
GLOBULIN PLAS-MCNC: 3.3 G/DL (ref 2.8–4.4)
GLUCOSE BLD-MCNC: 85 MG/DL (ref 70–99)
GLUCOSE UR-MCNC: NEGATIVE MG/DL
HCT VFR BLD AUTO: 46.6 %
HDLC SERPL-MCNC: 46 MG/DL (ref 40–59)
HGB BLD-MCNC: 15.5 G/DL
HGB UR QL STRIP.AUTO: NEGATIVE
KETONES UR-MCNC: NEGATIVE MG/DL
LDLC SERPL CALC-MCNC: 93 MG/DL (ref ?–100)
LEUKOCYTE ESTERASE UR QL STRIP.AUTO: NEGATIVE
MCH RBC QN AUTO: 31 PG (ref 26–34)
MCHC RBC AUTO-ENTMCNC: 33.3 G/DL (ref 31–37)
MCV RBC AUTO: 93.2 FL
NITRITE UR QL STRIP.AUTO: NEGATIVE
NONHDLC SERPL-MCNC: 116 MG/DL (ref ?–130)
OSMOLALITY SERPL CALC.SUM OF ELEC: 284 MOSM/KG (ref 275–295)
PH UR: 5 [PH] (ref 5–8)
PLATELET # BLD AUTO: 178 10(3)UL (ref 150–450)
POTASSIUM SERPL-SCNC: 3.1 MMOL/L (ref 3.5–5.1)
PROT SERPL-MCNC: 7.3 G/DL (ref 6.4–8.2)
PROT UR-MCNC: NEGATIVE MG/DL
RBC # BLD AUTO: 5 X10(6)UL
SODIUM SERPL-SCNC: 136 MMOL/L (ref 136–145)
SP GR UR STRIP: 1.02 (ref 1–1.03)
TRIGL SERPL-MCNC: 131 MG/DL (ref 30–149)
TSI SER-ACNC: 2.58 MIU/ML (ref 0.36–3.74)
UROBILINOGEN UR STRIP-ACNC: <2
VIT C UR-MCNC: NEGATIVE MG/DL
VLDLC SERPL CALC-MCNC: 21 MG/DL (ref 0–30)
WBC # BLD AUTO: 7.4 X10(3) UL (ref 4–11)

## 2022-03-15 PROCEDURE — G0009 ADMIN PNEUMOCOCCAL VACCINE: HCPCS | Performed by: FAMILY MEDICINE

## 2022-03-15 PROCEDURE — 36415 COLL VENOUS BLD VENIPUNCTURE: CPT

## 2022-03-15 PROCEDURE — G0008 ADMIN INFLUENZA VIRUS VAC: HCPCS | Performed by: FAMILY MEDICINE

## 2022-03-15 PROCEDURE — 81003 URINALYSIS AUTO W/O SCOPE: CPT

## 2022-03-15 PROCEDURE — 80053 COMPREHEN METABOLIC PANEL: CPT

## 2022-03-15 PROCEDURE — 85027 COMPLETE CBC AUTOMATED: CPT

## 2022-03-15 PROCEDURE — 84443 ASSAY THYROID STIM HORMONE: CPT

## 2022-03-15 PROCEDURE — 90670 PCV13 VACCINE IM: CPT | Performed by: FAMILY MEDICINE

## 2022-03-15 PROCEDURE — 90662 IIV NO PRSV INCREASED AG IM: CPT | Performed by: FAMILY MEDICINE

## 2022-03-15 PROCEDURE — 80061 LIPID PANEL: CPT

## 2022-03-15 PROCEDURE — G0439 PPPS, SUBSEQ VISIT: HCPCS | Performed by: FAMILY MEDICINE

## 2022-03-15 RX ORDER — HYDROCHLOROTHIAZIDE 25 MG/1
25 TABLET ORAL DAILY
Qty: 90 TABLET | Refills: 1 | Status: SHIPPED | OUTPATIENT
Start: 2022-03-15

## 2022-03-15 RX ORDER — SIMVASTATIN 20 MG
20 TABLET ORAL DAILY
Qty: 90 TABLET | Refills: 1 | Status: SHIPPED | OUTPATIENT
Start: 2022-03-15

## 2022-03-31 ENCOUNTER — HOSPITAL ENCOUNTER (OUTPATIENT)
Age: 67
Discharge: HOME | End: 2022-03-31
Payer: MEDICARE

## 2022-03-31 VITALS
SYSTOLIC BLOOD PRESSURE: 136 MMHG | OXYGEN SATURATION: 98 % | HEART RATE: 91 BPM | RESPIRATION RATE: 18 BRPM | TEMPERATURE: 98 F | DIASTOLIC BLOOD PRESSURE: 84 MMHG

## 2022-03-31 DIAGNOSIS — J01.90 ACUTE SINUSITIS, RECURRENCE NOT SPECIFIED, UNSPECIFIED LOCATION: ICD-10-CM

## 2022-03-31 DIAGNOSIS — Z20.822 ENCOUNTER FOR LABORATORY TESTING FOR COVID-19 VIRUS: Primary | ICD-10-CM

## 2022-03-31 LAB — SARS-COV-2 RNA RESP QL NAA+PROBE: NOT DETECTED

## 2022-03-31 PROCEDURE — 99203 OFFICE O/P NEW LOW 30 MIN: CPT | Performed by: EMERGENCY MEDICINE

## 2022-03-31 PROCEDURE — U0002 COVID-19 LAB TEST NON-CDC: HCPCS | Performed by: EMERGENCY MEDICINE

## 2022-03-31 RX ORDER — DOXYCYCLINE HYCLATE 100 MG/1
100 CAPSULE ORAL 2 TIMES DAILY
Qty: 20 CAPSULE | Refills: 0 | Status: SHIPPED | OUTPATIENT
Start: 2022-03-31 | End: 2022-04-10

## 2022-03-31 NOTE — ED INITIAL ASSESSMENT (HPI)
Pt came in due to cough, congestion, runny nose, chills, fever, and sneezing for the past week. Pt denies any sob, cp, nvd, ha, fever, or any dizziness. Pt has easy non labored respirations.

## 2022-04-08 PROBLEM — R41.3 MEMORY LOSS: Status: RESOLVED | Noted: 2018-09-18 | Resolved: 2022-04-08

## 2022-07-21 ENCOUNTER — OFFICE VISIT (OUTPATIENT)
Dept: FAMILY MEDICINE CLINIC | Facility: CLINIC | Age: 67
End: 2022-07-21
Payer: MEDICARE

## 2022-07-21 VITALS
HEART RATE: 93 BPM | OXYGEN SATURATION: 95 % | RESPIRATION RATE: 18 BRPM | BODY MASS INDEX: 34 KG/M2 | TEMPERATURE: 98 F | SYSTOLIC BLOOD PRESSURE: 145 MMHG | DIASTOLIC BLOOD PRESSURE: 94 MMHG | WEIGHT: 190 LBS

## 2022-07-21 DIAGNOSIS — R05.2 SUBACUTE COUGH: Primary | ICD-10-CM

## 2022-07-21 LAB — AMB EXT COVID-19 RESULT: DETECTED

## 2022-07-21 PROCEDURE — 99213 OFFICE O/P EST LOW 20 MIN: CPT | Performed by: FAMILY MEDICINE

## 2022-07-21 RX ORDER — ALBUTEROL SULFATE 90 UG/1
2 AEROSOL, METERED RESPIRATORY (INHALATION) EVERY 4 HOURS PRN
Qty: 1 EACH | Refills: 0 | Status: SHIPPED | OUTPATIENT
Start: 2022-07-21

## 2022-07-22 ENCOUNTER — PATIENT MESSAGE (OUTPATIENT)
Dept: FAMILY MEDICINE CLINIC | Facility: CLINIC | Age: 67
End: 2022-07-22

## 2022-07-22 ENCOUNTER — TELEPHONE (OUTPATIENT)
Dept: FAMILY MEDICINE CLINIC | Facility: CLINIC | Age: 67
End: 2022-07-22

## 2022-07-22 LAB — SARS-COV-2 RNA RESP QL NAA+PROBE: DETECTED

## 2022-07-22 NOTE — TELEPHONE ENCOUNTER
From: [facility]  To: Terry Miramontes DO  Sent: 7/22/2022 8:06 AM CDT  Subject: COVID positive     Good morning Dr Suhail Hare, I took a COVID test at home last night and it was positive. My oxygen is at 80, I had a slight 99.9 temperature last night and Romeo Ware has me walking around with my arms up and down every 4 hrs. She is giving me Tylenol every 4 hours to keep the fever away I just have a lot of cough.  She took a test as well  But is negative   [facility]

## 2022-07-22 NOTE — TELEPHONE ENCOUNTER
Patient was seen in office by Dr Harriet Favre yesterday. Had Covid PCR yesterday, still pending. States he took a home Covid test which came back positive. Calling today in response to [employer] message asking him to call. Reports main symptom is cough. Used albuterol yesterday but not needed it today. Denies fever, joint pain, headache, shortness of breath or chest pain. This AM pulse ox is 94%. Discussed home care. Patient was advised of the COVID-19 isolation guidelines per the CDC:  For a patient who tests positive for COVID-19 (regardless of vaccination status):  -The patient is to stay home and isolate for 5 days after positive COVID-19 test or date of onset of symptoms (whichever date came first). -On days 6-10, if the patient has no symptoms or if symptoms are resolving, they can leave their house as long as they no longer have a fever for 24 hours without the use of fever reducing medication, however, they must continue to wear a mask around others until the 10th day after the positive COVID-19 test or date of onset of symptoms. -If they cannot quarantine, they must wear a mask for 10 days. Patient was advised to continue to monitor symptoms and to call us back with any questions in regards to symptoms or if symptoms worsen. In addition, patient was advised to go to the emergency room for any severe worsening of symptoms or if they develop any difficulty breathing, chest pain, severe pain, or persistent low oxygen saturation readings. Patient verbalized understanding for criteria of when to call office for assistance when to go to the emergency room.

## 2022-07-22 NOTE — PROGRESS NOTES
HPI: Mario Moser is a 70year old male who presents for forgetfulness. Pt reports that family has a history of aneurysm. Father passed as well as his aunt/uncles. Wife reports he has been getting mood swings for the past few months. Changes quickly.  Gets upset anti-histamine. Fasting labs ordered. Advised to make physical appt soon  Memory loss    Seems to be sudden onset. MRI brain ordered. Family history of aneurysm    MRA brain ordered.      Heriberto Dandy, DO Negative

## 2022-09-02 RX ORDER — SIMVASTATIN 20 MG
20 TABLET ORAL DAILY
Qty: 90 TABLET | Refills: 1 | Status: SHIPPED | OUTPATIENT
Start: 2022-09-02

## 2022-09-02 NOTE — TELEPHONE ENCOUNTER
Refill passed per [facility] protocol.   Requested Prescriptions   Pending Prescriptions Disp Refills    SIMVASTATIN 20 MG Oral Tab [Pharmacy Med Name: Simvastatin Oral Tablet 20 MG] 90 tablet 0     Sig: TAKE 1 TABLET BY MOUTH DAILY        Cholesterol Medication Protocol Passed - 9/2/2022 10:12 AM        Passed - ALT in past 12 months        Passed - LDL in past 12 months        Passed - Last ALT < 80       Lab Results   Component Value Date    ALT 29 03/15/2022             Passed - Last LDL < 130     Lab Results   Component Value Date    LDL 93 03/15/2022               Passed - In person appointment or virtual visit in the past 12 mos or appointment in next 3 mos       Recent Outpatient Visits              1 month ago Subacute cough    [facility], [facility], [address], [place], [place]    Office Visit    5 months ago Encounter for annual health examination    [facility], [facility], [address], [place], [place], [place]    Office Visit    1 year ago Nail dystrophy    [facility], [address], [address] , [place] [facility], D[place]    Office Visit    1 year ago Essential hypertension    [facility], [facility], [address], [place], [place], [place]    Office Visit    2 years ago Essential hypertension    [facility], [facility], [address], [place], [place], [facility] Visits              1 month ago Subacute cough    [facility], [address], [place], [place], [address]    5 months ago Encounter for annual health examination    [facility], [facility], [address], [place], [place], [place]    Office Visit    1 year ago Nail dystrophy    [facility], [address], [address], [place], DPM    Office Visit    1 year ago Essential hypertension    [facility], [facility], [address], [place], [place], [place]    Office Visit    2 years ago Essential hypertension    [facility], [facility], [address], [place], [place]    Telemedicine

## 2022-09-12 RX ORDER — HYDROCHLOROTHIAZIDE 25 MG/1
25 TABLET ORAL DAILY
Qty: 90 TABLET | Refills: 0 | Status: SHIPPED | OUTPATIENT
Start: 2022-09-12 | End: 2022-09-12

## 2022-09-12 RX ORDER — HYDROCHLOROTHIAZIDE 25 MG/1
25 TABLET ORAL DAILY
Qty: 90 TABLET | Refills: 1 | Status: SHIPPED | OUTPATIENT
Start: 2022-09-12

## 2022-09-12 NOTE — TELEPHONE ENCOUNTER
Protocol failed or has No Protocol, please review  Requested Prescriptions   Pending Prescriptions Disp Refills    HYDROCHLOROTHIAZIDE 25 MG Oral Tab [Pharmacy Med Name: hydroCHLOROthiazide Oral Tablet 25 MG] 90 tablet 0     Sig: TAKE 1 TABLET BY MOUTH DAILY        Hypertensive Medications Protocol Failed - 9/12/2022  2:06 PM        Failed - Last BP reading less than 140/90     BP Readings from Last 1 Encounters:  07/21/22 : (!) 145/94                Passed - In person appointment in the past 12 or next 3 months       Recent Outpatient Visits              1 month ago Subacute cough    [address], [address], [place], [place], [facility]    Office Visit    6 months ago Encounter for annual health examination    [address], [place], [place]    Office Visit    1 year ago Nail dystrophy    [address] [place], [place], [address] , [facility] [place], [place]    Office Visit    1 year ago Essential hypertension    [address], [address], [place], [place]    Office Visit    2 years ago Essential hypertension    [address], [address], [place], [place], [facility]    [address] or BMP in past 6 months     Recent Results (from the past 4392 hour(s))   COMP METABOLIC PANEL (14)    Collection Time: 03/15/22 12:16 PM   Result Value Ref Range    Glucose 85 70 - 99 mg/dL    Sodium 136 136 - 145 mmol/L    Potassium 3.1 (L) 3.5 - 5.1 mmol/L    Chloride 103 98 - 112 mmol/L    CO2 28.0 21.0 - 32.0 mmol/L    Anion Gap 5 0 - 18 mmol/L    BUN 19 (H) 7 - 18 mg/dL    Creatinine 1.06 0.70 - 1.30 mg/dL    BUN/CREA Ratio 17.9 10.0 - 20.0    Calcium, Total 9.3 8.5 - 10.1 mg/dL    Calculated Osmolality 284 275 - 295 mOsm/kg    GFR, Non- 72 >=60    GFR, -American 83 >=60    ALT 29 16 - 61 U/L    AST 18 15 - 37 U/L    Alkaline Phosphatase 43 (L) 45 - 117 U/L    Bilirubin, Total 0.9 0.1 - 2.0 mg/dL    Total Protein 7.3 6.4 - 8.2 g/dL    Albumin 4.0 3.4 - 5.0 g/dL    Globulin  3.3 2.8 - 4.4 g/dL    A/G Ratio 1.2 1.0 - 2.0    Patient Fasting for CMP? Yes      *Note: Due to a large number of results and/or encounters for the requested time period, some results have not been displayed. A complete set of results can be found in Results Review.                  Passed - In person appointment or virtual visit in the past 6 months       Recent Outpatient Visits              1 month ago Subacute cough    [facility], [address], [place], [place], [place]    Office Visit    6 months ago Encounter for annual health examination    [facility], [facility], [address], [place], [place], [place]    Office Visit    1 year ago Nail dystrophy    [address] [facility], [address], [address] , [place], DPM    Office Visit    1 year ago Essential hypertension    [facility], [address], [place], [place], [place]    Office Visit    2 years ago Essential hypertension    [facility], [facility], [address], [place], [address] - GFR > 50     No results found for: [facility]                    Recent Outpatient Visits              1 month ago Subacute cough    [facility], [address], [place], [place], [place]    Office Visit    6 months ago Encounter for annual health examination    [facility], [address], [place], [place], [place]    Office Visit    1 year ago Nail dystrophy    [address] [facility], [address], [address], [place], DPM    Office Visit    1 year ago Essential hypertension    [facility], [facility], [address], [place], [place], [place]    Office Visit    2 years ago Essential hypertension    [facility], [address], [place], [place], [place]    Telemedicine

## 2022-09-13 NOTE — TELEPHONE ENCOUNTER
Please review. Protocol failed.   Requested Prescriptions   Pending Prescriptions Disp Refills    HYDROCHLOROTHIAZIDE 25 MG Oral Tab [Pharmacy Med Name: hydroCHLOROthiazide Oral Tablet 25 MG] 90 tablet 0     Sig: TAKE 1 TABLET BY MOUTH DAILY        Hypertensive Medications Protocol Failed - 9/12/2022  8:34 PM        Failed - Last BP reading less than 140/90     BP Readings from Last 1 Encounters:  07/21/22 : (!) 145/94                Passed - In person appointment in the past 12 or next 3 months       Recent Outpatient Visits              1 month ago Subacute cough    [address], [address], [place], [facility]    Office Visit    6 months ago Encounter for annual health examination    [address], [address], [place], DO    Office Visit    1 year ago Nail dystrophy    [facility], [address], [address], [facility], DP    Office Visit    1 year ago Essential hypertension    [address], [address], [place], [place]    Office Visit    2 years ago Essential hypertension    [address], [address], [place], [place], [facility]    Telemedicine                 Passed - CMP or BMP in past 6 months     Recent Results (from the past 4392 hour(s))   COMP METABOLIC PANEL (14)    Collection Time: 03/15/22 12:16 PM   Result Value Ref Range    Glucose 85 70 - 99 mg/dL    Sodium 136 136 - 145 mmol/L    Potassium 3.1 (L) 3.5 - 5.1 mmol/L    Chloride 103 98 - 112 mmol/L    CO2 28.0 21.0 - 32.0 mmol/L    Anion Gap 5 0 - 18 mmol/L    BUN 19 (H) 7 - 18 mg/dL    Creatinine 1.06 0.70 - 1.30 mg/dL    BUN/CREA Ratio 17.9 10.0 - 20.0    Calcium, Total 9.3 8.5 - 10.1 mg/dL    Calculated Osmolality 284 275 - 295 mOsm/kg    GFR, Non- 72 >=60    GFR, -American 83 >=60    ALT 29 16 - 61 U/L    AST 18 15 - 37 U/L    Alkaline Phosphatase 43 (L) 45 - 117 U/L    Bilirubin, Total 0.9 0.1 - 2.0 mg/dL    Total Protein 7.3 6.4 - 8.2 g/dL    Albumin 4.0 3.4 - 5.0 g/dL    Globulin  3.3 2.8 - 4.4 g/dL    A/G Ratio 1.2 1.0 - 2.0    Patient Fasting for CMP? Yes      *Note: Due to a large number of results and/or encounters for the requested time period, some results have not been displayed. A complete set of results can be found in Results Review.                  Passed - In person appointment or virtual visit in the past 6 months       Recent Outpatient Visits              1 month ago Subacute cough    [address], [address], [place], [place], [place]    Office Visit    6 months ago Encounter for annual health examination    [address], [address], [place], [place], DO    Office Visit    1 year ago Nail dystrophy    [facility], [address], [address], [place], DPM    Office Visit    1 year ago Essential hypertension    [address], [address], [place], [place], DO    Office Visit    2 years ago Essential hypertension    [address], [address], [place], [address] - GFR > 50     No results found for: [facility] [address]                    Recent Outpatient Visits              1 month ago Subacute cough    [address], [address], [place], [place], DO    Office Visit    6 months ago Encounter for annual health examination    [address], [address], [place], [place], DO    Office Visit    1 year ago Nail dystrophy    [facility], [address], [address], [place], DPM    Office Visit    1 year ago Essential hypertension    [address], [address], [place], [place], DO    Office Visit    2 years ago Essential hypertension    [address], [address], [place], [place], [place]    Telemedicine

## 2022-09-14 RX ORDER — HYDROCHLOROTHIAZIDE 25 MG/1
25 TABLET ORAL DAILY
Qty: 90 TABLET | Refills: 0 | Status: SHIPPED | OUTPATIENT
Start: 2022-09-14

## 2022-09-16 ENCOUNTER — PATIENT MESSAGE (OUTPATIENT)
Dept: FAMILY MEDICINE CLINIC | Facility: CLINIC | Age: 67
End: 2022-09-16

## 2022-09-16 NOTE — TELEPHONE ENCOUNTER
See Smartestingt message to pts' question above om 9/16/22.     Please reply to pool: BRIANNA Garrett

## 2022-09-26 RX ORDER — ALBUTEROL SULFATE 90 UG/1
2 AEROSOL, METERED RESPIRATORY (INHALATION) EVERY 4 HOURS PRN
Qty: 8.5 G | Refills: 1 | Status: SHIPPED | OUTPATIENT
Start: 2022-09-26

## 2022-09-26 NOTE — TELEPHONE ENCOUNTER
Refill passed per [facility] protocol.   Requested Prescriptions   Pending Prescriptions Disp Refills    ALBUTEROL 108 (90 Base) MCG/ACT Inhalation Aero Soln [Pharmacy Med Name: Albuterol Sulfate HFA Inhalation Aerosol Solution 108 (90 Base) MCG/ACT] 8.5 g 0     Sig: INHALE TWO PUFFS BY MOUTH EVERY FOUR HOURS AS NEEDED FOR WHEEZING        Asthma & COPD Medication Protocol Passed - 9/25/2022  8:27 PM        Passed - In person appointment or virtual visit in the past 6 mos or appointment in next 3 mos       Recent Outpatient Visits              2 months ago Subacute cough    [facility] [facility], [address], [place]    Office Visit    6 months ago Encounter for annual health examination    [facility], [facility], [address], [place] DO    Office Visit    1 year ago Nail dystrophy    [facility], [place], [address] [place] [address], [facility]    Office Visit    1 year ago Essential hypertension    [place] [facility], [facility], [address], [place] Cart[place] DO    Office Visit    2 years ago Essential hypertension    [facility], [address], [place] DO    Telemedicine     Future Appointments         Provider Department Appt Notes    In 1 week Margarita Smiley DO [facility], [facility], [address], [place]a gomez Since I had COVID I have a cough and tightness on my chest when I cough                   Recent Outpatient Visits              2 months ago Subacute cough    [facility], [facility], [address], [place] Cart[place] DO    Office Visit    6 months ago Encounter for annual health examination    [facility], [address], [place] DO    Office Visit    1 year ago Nail dystrophy    [facility], [place], [address] [place] [address], [facility]    Office Visit    1 year ago Essential hypertension    [place] [facility], [facility], [address], [place]    Office Visit    2 years ago Essential hypertension    [facility], [facility], [address], [place],     Telemedicine          Future Appointments         Provider Department Appt Notes    In 1 week Dwain Cameron DO [facility], [facility], [address], [place] gomez Since I had COVID I have a cough and tightness on my chest when I cough

## 2022-10-05 ENCOUNTER — OFFICE VISIT (OUTPATIENT)
Dept: FAMILY MEDICINE CLINIC | Facility: CLINIC | Age: 67
End: 2022-10-05
Payer: MEDICARE

## 2022-10-05 VITALS
WEIGHT: 188 LBS | SYSTOLIC BLOOD PRESSURE: 138 MMHG | TEMPERATURE: 98 F | HEART RATE: 76 BPM | RESPIRATION RATE: 16 BRPM | BODY MASS INDEX: 33 KG/M2 | DIASTOLIC BLOOD PRESSURE: 82 MMHG | OXYGEN SATURATION: 96 %

## 2022-10-05 DIAGNOSIS — R05.9 COUGH, UNSPECIFIED TYPE: Primary | ICD-10-CM

## 2022-10-05 PROCEDURE — 99213 OFFICE O/P EST LOW 20 MIN: CPT | Performed by: FAMILY MEDICINE

## 2022-10-05 RX ORDER — BECLOMETHASONE DIPROPIONATE HFA 40 UG/1
1 AEROSOL, METERED RESPIRATORY (INHALATION) 2 TIMES DAILY
Qty: 1 EACH | Refills: 1 | Status: SHIPPED | OUTPATIENT
Start: 2022-10-05

## 2022-10-05 RX ORDER — HYDROXYZINE HYDROCHLORIDE 25 MG/1
25 TABLET, FILM COATED ORAL DAILY
COMMUNITY
Start: 2022-09-13 | End: 2022-10-05 | Stop reason: ALTCHOICE

## 2022-10-06 ENCOUNTER — TELEPHONE (OUTPATIENT)
Dept: FAMILY MEDICINE CLINIC | Facility: CLINIC | Age: 67
End: 2022-10-06

## 2022-10-06 NOTE — TELEPHONE ENCOUNTER
[id number] [facility]# [phone]    Dr Samreen Ron prescribed:  Qvar Rx *but it is not covered by patient's insurance    Other options covered are:    Arnuty  Flovent discus or hfa  Symbicort    Call back to advise.

## 2022-10-07 RX ORDER — FLUTICASONE PROPIONATE 110 UG/1
2 AEROSOL, METERED RESPIRATORY (INHALATION) 2 TIMES DAILY
Qty: 1 EACH | Refills: 1 | Status: SHIPPED | OUTPATIENT
Start: 2022-10-07 | End: 2023-10-02

## 2022-10-07 NOTE — TELEPHONE ENCOUNTER
Disp Refills Start End    fluticasone propionate (FLOVENT HFA) 110 MCG/ACT Inhalation Aerosol 1 each 1 10/7/2022 10/2/2023    Sig - Route: Inhale 2 puffs into the lungs 2 (two) times daily. - Inhalation    Sent to pharmacy as: Fluticasone Propionate  MCG/ACT Inhalation Aerosol (Flovent HFA)    E-Prescribing Status: Receipt confirmed by pharmacy (10/7/2022 12:45 PM CDT)

## 2023-02-14 ENCOUNTER — OFFICE VISIT (OUTPATIENT)
Dept: FAMILY MEDICINE CLINIC | Facility: CLINIC | Age: 68
End: 2023-02-14
Payer: MEDICARE

## 2023-02-14 VITALS
TEMPERATURE: 98 F | HEART RATE: 76 BPM | DIASTOLIC BLOOD PRESSURE: 88 MMHG | RESPIRATION RATE: 16 BRPM | SYSTOLIC BLOOD PRESSURE: 131 MMHG | WEIGHT: 185 LBS | BODY MASS INDEX: 33 KG/M2

## 2023-02-14 DIAGNOSIS — L91.8 SKIN TAGS, MULTIPLE ACQUIRED: ICD-10-CM

## 2023-02-14 DIAGNOSIS — L98.9 SKIN LESION OF CHEST WALL: Primary | ICD-10-CM

## 2023-02-14 PROCEDURE — 99213 OFFICE O/P EST LOW 20 MIN: CPT | Performed by: FAMILY MEDICINE

## 2023-02-16 ENCOUNTER — LAB REQUISITION (OUTPATIENT)
Dept: LAB | Facility: HOSPITAL | Age: 68
End: 2023-02-16
Payer: MEDICARE

## 2023-02-16 ENCOUNTER — APPOINTMENT (OUTPATIENT)
Dept: URBAN - METROPOLITAN AREA CLINIC 244 | Age: 68
Setting detail: DERMATOLOGY
End: 2023-02-17

## 2023-02-16 DIAGNOSIS — D22 MELANOCYTIC NEVI: ICD-10-CM

## 2023-02-16 DIAGNOSIS — L91.8 OTHER HYPERTROPHIC DISORDERS OF THE SKIN: ICD-10-CM

## 2023-02-16 DIAGNOSIS — L82.1 OTHER SEBORRHEIC KERATOSIS: ICD-10-CM

## 2023-02-16 DIAGNOSIS — L53.8 OTHER SPECIFIED ERYTHEMATOUS CONDITIONS: ICD-10-CM

## 2023-02-16 DIAGNOSIS — D48.5 NEOPLASM OF UNCERTAIN BEHAVIOR OF SKIN: ICD-10-CM

## 2023-02-16 DIAGNOSIS — L81.4 OTHER MELANIN HYPERPIGMENTATION: ICD-10-CM

## 2023-02-16 PROBLEM — D22.5 MELANOCYTIC NEVI OF TRUNK: Status: ACTIVE | Noted: 2023-02-16

## 2023-02-16 PROBLEM — D22.62 MELANOCYTIC NEVI OF LEFT UPPER LIMB, INCLUDING SHOULDER: Status: ACTIVE | Noted: 2023-02-16

## 2023-02-16 PROBLEM — D22.61 MELANOCYTIC NEVI OF RIGHT UPPER LIMB, INCLUDING SHOULDER: Status: ACTIVE | Noted: 2023-02-16

## 2023-02-16 PROCEDURE — 88305 TISSUE EXAM BY PATHOLOGIST: CPT | Performed by: DERMATOLOGY

## 2023-02-16 PROCEDURE — 11301 SHAVE SKIN LESION 0.6-1.0 CM: CPT

## 2023-02-16 PROCEDURE — OTHER COUNSELING: OTHER

## 2023-02-16 PROCEDURE — OTHER SHAVE REMOVAL: OTHER

## 2023-02-16 PROCEDURE — 99203 OFFICE O/P NEW LOW 30 MIN: CPT | Mod: 25

## 2023-02-16 ASSESSMENT — LOCATION SIMPLE DESCRIPTION DERM
LOCATION SIMPLE: RIGHT UPPER ARM
LOCATION SIMPLE: LEFT UPPER ARM
LOCATION SIMPLE: LEFT FOREARM
LOCATION SIMPLE: CHEST

## 2023-02-16 ASSESSMENT — LOCATION ZONE DERM
LOCATION ZONE: ARM
LOCATION ZONE: TRUNK

## 2023-02-16 ASSESSMENT — LOCATION DETAILED DESCRIPTION DERM
LOCATION DETAILED: MIDDLE STERNUM
LOCATION DETAILED: UPPER STERNUM
LOCATION DETAILED: LEFT VENTRAL PROXIMAL FOREARM
LOCATION DETAILED: RIGHT ANTERIOR DISTAL UPPER ARM
LOCATION DETAILED: RIGHT ANTECUBITAL SKIN
LOCATION DETAILED: RIGHT MEDIAL SUPERIOR CHEST
LOCATION DETAILED: LEFT ANTECUBITAL SKIN
LOCATION DETAILED: LEFT ANTERIOR DISTAL UPPER ARM
LOCATION DETAILED: STERNUM

## 2023-02-16 NOTE — PROCEDURE: SHAVE REMOVAL
Wound Care: Petrolatum
Was A Bandage Applied: Yes
Biopsy Method: double edge Personna blade
Consent was obtained from the patient. The risks and benefits to therapy were discussed in detail. Specifically, the risks of infection, scarring, bleeding, prolonged wound healing, incomplete removal, allergy to anesthesia, nerve injury and recurrence were addressed.
Medical Necessity Clause: This procedure was medically necessary because the lesion that was treated was:
Bill 04011 For Specimen Handling/Conveyance To Laboratory?: no
Size Of Lesion In Cm (Required): 0.6
Hemostasis: Drysol
Anesthesia Type: 0.5% lidocaine with 1:200,000 epinephrine and a 1:10 solution of 8.4% sodium bicarbonate
Post-Care Instructions: I reviewed with the patient in detail post-care instructions. Patient is to keep the biopsy site dry overnight, and then apply petrolatum twice daily until healed or after one or two night patient may leave area open and dry and a scab will form which will eventually fall off in a few weeks without further care other than cleaning twice a day.
Notification Instructions: Patient will be notified of pathology results. However, patient instructed to call the office if not contacted within 2 weeks.
Medical Necessity Information: It is in your best interest to select a reason for this procedure from the list below. All of these items fulfill various CMS LCD requirements except the new and changing color options.
Billing Type: Third-Party Bill
Path Notes (To The Dermatopathologist): Irritated Acrochordon vs Neoplasm
Detail Level: Detailed
X Size Of Lesion In Cm (Optional): 0

## 2023-03-29 RX ORDER — SIMVASTATIN 20 MG
20 TABLET ORAL DAILY
Qty: 90 TABLET | Refills: 0 | Status: SHIPPED | OUTPATIENT
Start: 2023-03-29

## 2023-03-29 NOTE — TELEPHONE ENCOUNTER
Protocol failed or has No Protocol, please review  Requested Prescriptions   Pending Prescriptions Disp Refills    SIMVASTATIN 20 MG Oral Tab [Pharmacy Med Name: Simvastatin Oral Tablet 20 MG] 90 tablet 0     Sig: TAKE 1 TABLET BY MOUTH DAILY       Cholesterol Medication Protocol Failed - 3/29/2023 10:57 AM        Failed - ALT in past 12 months        Failed - LDL in past 12 months        Failed - Last ALT < 80     Lab Results   Component Value Date    ALT 29 03/15/2022             Failed - Last LDL < 130     Lab Results   Component Value Date    LDL 93 03/15/2022             Passed - In person appointment or virtual visit in the past 12 mos or appointment in next 3 mos     Recent Outpatient Visits              1 month ago Skin lesion of chest wall    [facility], [address], [place], [place]    Office Visit    5 months ago Cough, unspecified type    [address] [place]    Office Visit    8 months ago Subacute cough    [address] [place], [place]    Office Visit    1 year ago Encounter for annual health examination    [address] [place]    Office Visit    1 year ago Nail dystrophy    [address] [place], [place], DPM    Office Visit          Future Appointments         Provider Department Appt Notes    In 2 weeks DO Dillan Hernandez [address], [facility]                  Future Appointments         Provider Department Appt Notes    In 2 weeks Dillan Hernandez [address], [address] [facility] Outpatient Visits              1 month ago Skin lesion of chest wall    [facility], [address], [place]    Office Visit    5 months ago Cough, unspecified type    [address] [place] [address]    Office Visit    8 months ago Subacute cough    [address] [place] [address]    Office Visit    1 year ago Encounter for annual health examination    [address] [place] [address]    Office Visit    1 year ago Nail dystrophy    [address] [place] [address], Adin Malik [facility]    Office Visit

## 2023-03-30 RX ORDER — HYDROCHLOROTHIAZIDE 25 MG/1
25 TABLET ORAL DAILY
Qty: 90 TABLET | Refills: 1 | Status: SHIPPED | OUTPATIENT
Start: 2023-03-30

## 2023-03-30 NOTE — TELEPHONE ENCOUNTER
Protocol failed or has No Protocol, please review  Requested Prescriptions   Pending Prescriptions Disp Refills    HYDROCHLOROTHIAZIDE 25 MG Oral Tab [Pharmacy Med Name: hydroCHLOROthiazide Oral Tablet 25 MG] 90 tablet 0     Sig: TAKE 1 TABLET BY MOUTH DAILY       Hypertensive Medications Protocol Failed - 3/29/2023  8:09 PM        Failed - CMP or BMP in past 6 months     No results found for this or any previous visit (from the past 4392 hour(s)).             Failed - EGFRCR or GFRNAA > 50     GFR Evaluation            Passed - In person appointment in the past 12 or next 3 months     Recent Outpatient Visits              1 month ago Skin lesion of chest wall    [facility] [address], [place], [place], [place]    Office Visit    5 months ago Cough, unspecified type    [address] [facility], [place], [place], [place]    Office Visit    8 months ago Subacute cough    [address] [facility], [place], [place], [place]    Office Visit    1 year ago Encounter for annual health examination    [address] [facility], [place], [place], [place]    Office Visit    1 year ago Nail dystrophy    [address] [facility], [place] Jessie Ed[place] DPM    Office Visit          Future Appointments         Provider Department Appt Notes    In 2 weeks Shantelle Choi DO [address], [address] BP reading less than 140/90     BP Readings from Last 1 Encounters:  02/14/23 : 131/88              Passed - In person appointment or virtual visit in the past 6 months     Recent Outpatient Visits              1 month ago Skin lesion of chest wall    [facility], [facility] Visit    5 months ago Cough, unspecified type    [address] [facility], [place], DO    Office Visit    8 months ago Subacute cough    [address], [place], [place], [place]    Office Visit    1 year ago Encounter for annual health examination    [address], [place], [place], [place]    Office Visit    1 year ago Nail dystrophy    [address], [place], [place], [place]    Office Visit          Future Appointments         Provider Department Appt Notes    In 2 weeks [facility], DO [address], [facility]                  Future Appointments         Provider Department Appt Notes    In 2 weeks [facility], DO [address], [address] Outpatient Visits              1 month ago Skin lesion of chest wall    [facility], [address], [place], [place], [place]    Office Visit    5 months ago Cough, unspecified type    [address], [place], [place], [place]    Office Visit    8 months ago Subacute cough    [address], [place], [place], [place]    Office Visit    1 year ago Encounter for annual health examination    [address], [place], [place], [place]    Office Visit    1 year ago Nail dystrophy    [address], [place], [place], [place]    Office Visit

## 2023-04-18 ENCOUNTER — LAB ENCOUNTER (OUTPATIENT)
Dept: LAB | Age: 68
End: 2023-04-18
Attending: FAMILY MEDICINE
Payer: MEDICARE

## 2023-04-18 ENCOUNTER — OFFICE VISIT (OUTPATIENT)
Dept: FAMILY MEDICINE CLINIC | Facility: CLINIC | Age: 68
End: 2023-04-18

## 2023-04-18 VITALS
HEIGHT: 63 IN | HEART RATE: 63 BPM | RESPIRATION RATE: 16 BRPM | SYSTOLIC BLOOD PRESSURE: 132 MMHG | WEIGHT: 183 LBS | BODY MASS INDEX: 32.43 KG/M2 | DIASTOLIC BLOOD PRESSURE: 82 MMHG | TEMPERATURE: 98 F

## 2023-04-18 DIAGNOSIS — Z00.00 ENCOUNTER FOR ANNUAL HEALTH EXAMINATION: Primary | ICD-10-CM

## 2023-04-18 DIAGNOSIS — I10 ESSENTIAL HYPERTENSION: ICD-10-CM

## 2023-04-18 DIAGNOSIS — Z86.010 HISTORY OF COLON POLYPS: ICD-10-CM

## 2023-04-18 DIAGNOSIS — E78.5 HYPERLIPIDEMIA, UNSPECIFIED HYPERLIPIDEMIA TYPE: ICD-10-CM

## 2023-04-18 PROCEDURE — G0439 PPPS, SUBSEQ VISIT: HCPCS | Performed by: FAMILY MEDICINE

## 2023-04-18 PROCEDURE — 90677 PCV20 VACCINE IM: CPT | Performed by: FAMILY MEDICINE

## 2023-04-18 PROCEDURE — 1126F AMNT PAIN NOTED NONE PRSNT: CPT | Performed by: FAMILY MEDICINE

## 2023-04-18 PROCEDURE — 3079F DIAST BP 80-89 MM HG: CPT | Performed by: FAMILY MEDICINE

## 2023-04-18 PROCEDURE — 3075F SYST BP GE 130 - 139MM HG: CPT | Performed by: FAMILY MEDICINE

## 2023-04-18 PROCEDURE — 96160 PT-FOCUSED HLTH RISK ASSMT: CPT | Performed by: FAMILY MEDICINE

## 2023-04-18 PROCEDURE — 3008F BODY MASS INDEX DOCD: CPT | Performed by: FAMILY MEDICINE

## 2023-04-18 PROCEDURE — G0009 ADMIN PNEUMOCOCCAL VACCINE: HCPCS | Performed by: FAMILY MEDICINE

## 2023-04-18 RX ORDER — HYDROCHLOROTHIAZIDE 25 MG/1
25 TABLET ORAL DAILY
Qty: 90 TABLET | Refills: 3 | Status: SHIPPED | OUTPATIENT
Start: 2023-04-18

## 2023-04-18 RX ORDER — SIMVASTATIN 20 MG
20 TABLET ORAL DAILY
Qty: 90 TABLET | Refills: 3 | Status: SHIPPED | OUTPATIENT
Start: 2023-04-18

## 2023-04-19 LAB
ALBUMIN/GLOBULIN RATIO: 2.2 (CALC) (ref 1–2.5)
ALBUMIN: 4.3 G/DL (ref 3.6–5.1)
ALKALINE PHOSPHATASE: 39 U/L (ref 35–144)
ALT: 21 U/L (ref 9–46)
AST: 20 U/L (ref 10–35)
BILIRUBIN, TOTAL: 1 MG/DL (ref 0.2–1.2)
BUN: 21 MG/DL (ref 7–25)
CALCIUM: 9 MG/DL (ref 8.6–10.3)
CARBON DIOXIDE: 28 MMOL/L (ref 20–32)
CHLORIDE: 102 MMOL/L (ref 98–110)
CHOL/HDLC RATIO: 3.4 (CALC)
CHOLESTEROL, TOTAL: 142 MG/DL
CREATININE: 1.04 MG/DL (ref 0.7–1.35)
EGFR: 78 ML/MIN/1.73M2
GLOBULIN: 2 G/DL (CALC) (ref 1.9–3.7)
GLUCOSE: 96 MG/DL (ref 65–99)
HDL CHOLESTEROL: 42 MG/DL
HEMATOCRIT: 44.8 % (ref 38.5–50)
HEMOGLOBIN: 15.3 G/DL (ref 13.2–17.1)
LDL-CHOLESTEROL: 79 MG/DL (CALC)
MCH: 31.4 PG (ref 27–33)
MCHC: 34.2 G/DL (ref 32–36)
MCV: 91.8 FL (ref 80–100)
NON-HDL CHOLESTEROL: 100 MG/DL (CALC)
POTASSIUM: 3.3 MMOL/L (ref 3.5–5.3)
PROTEIN, TOTAL: 6.3 G/DL (ref 6.1–8.1)
RDW: 13 % (ref 11–15)
RED BLOOD CELL COUNT: 4.88 MILLION/UL (ref 4.2–5.8)
SODIUM: 140 MMOL/L (ref 135–146)
TRIGLYCERIDES: 118 MG/DL
TSH: 3.3 MIU/L (ref 0.4–4.5)
WHITE BLOOD CELL COUNT: 6.5 THOUSAND/UL (ref 3.8–10.8)

## 2023-06-13 ENCOUNTER — NURSE ONLY (OUTPATIENT)
Dept: FAMILY MEDICINE CLINIC | Facility: CLINIC | Age: 68
End: 2023-06-13

## 2023-06-13 DIAGNOSIS — Z23 NEED FOR HEPATITIS A IMMUNIZATION: Primary | ICD-10-CM

## 2023-06-13 PROCEDURE — 90471 IMMUNIZATION ADMIN: CPT | Performed by: FAMILY MEDICINE

## 2023-06-13 PROCEDURE — 90632 HEPA VACCINE ADULT IM: CPT | Performed by: FAMILY MEDICINE

## 2023-07-12 ENCOUNTER — HOSPITAL ENCOUNTER (OUTPATIENT)
Dept: CT IMAGING | Facility: HOSPITAL | Age: 68
Discharge: HOME | End: 2023-07-12
Attending: FAMILY MEDICINE

## 2023-07-12 VITALS — DIASTOLIC BLOOD PRESSURE: 80 MMHG | SYSTOLIC BLOOD PRESSURE: 150 MMHG

## 2023-07-12 DIAGNOSIS — I10 ESSENTIAL HYPERTENSION: ICD-10-CM

## 2023-07-12 NOTE — PROGRESS NOTES
Date of Service 7/12/2023    Cheyenne Richards  Date of Birth 7/5/1955    Patient Age: 77year old    PCP: Azeem Arrieta,   [address] [facility] [address] [facility]    Heart Scan Consult  Preliminary Heart Scan Score: 15.3    Previous Screening  Heart Scan Completed Previously: No        Peripheral Vascular Scan Completed Previously: No          Risk Factors  Personal Risk Factors  Non-alterable Risk Factors: Age;Gender      Body Mass Index  There is no height or weight on file to calculate BMI. Blood Pressure     /80 (BP Location: Right arm)   Pt takes hydroclorothiazide for BP, states nervous when coming to the hospital for appointments. (Normal =< 120/80,  Elevated = 120-129/ >80,  High Stage1 130-139/80-89 , Stage2 >140/>90)    Lipid Profile  Cholesterol: 142, done on 4/18/2023. HDL Cholesterol: 42, done on 4/18/2023. LDL Cholesterol: 79, done on 4/18/2023. TriGlycerides 118, done on 4/18/2023. Cholesterol Goals  Value   Total  =< 200   HDL  = > 45 Men = > 55 Women   LDL   =< 100   Triglycerides  =< 150       Glucose and Hemoglobin A1C  Lab Results   Component Value Date    GLU 96 04/18/2023     (Normal Fasting Glucose < 100mg/dl )    Nurse Review  Risk factor information and results reviewed with Nurse: Yes    Recommended Follow Up:  Consult your physician regarding[de-identified] Final Heart Scan Report; Discuss potential for Incidental Finding      Recommendations for Change:  Nutrition Changes: No Change Needed    Cholesterol Modification (goal of therapy depends upon your risk): No Change Needed Pt taking simvastatin. Labs values within normal ranges    Exercise: No Change Needed: exercises regularly 5-6x/week at gym. Busy with house related activities as well. Weight Management: Maintain Current Weight    Stress Management: No Change Needed    Repeat Heart Scan: 3 Years if Calcium Score is > 0. 0; Discuss with your Physician              [place] Recommended Resources:  Recommended Resources: PV Screening  Recommended PV Screening: Carotids: Brochure given, pt to schedule. [phone]. Chioma Mcgee, RN        Please Contact the Nurse Heart Line with any Questions or Concerns [phone].

## 2023-07-29 ENCOUNTER — PATIENT MESSAGE (OUTPATIENT)
Dept: FAMILY MEDICINE CLINIC | Facility: CLINIC | Age: 68
End: 2023-07-29

## 2023-07-29 DIAGNOSIS — R45.86 MOOD CHANGES: ICD-10-CM

## 2023-07-29 DIAGNOSIS — R41.3 MEMORY LOSS: Primary | ICD-10-CM

## 2023-07-31 NOTE — TELEPHONE ENCOUNTER
Dr. Rigoberto Baker, please advise. From Dr. Bowen Sham office note from 11/20/19:  \"1. 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 No obvious memory loss itself, which makes Alzheimer's disease unlikely, repeat neuropsychological testing also did not show any change that also supports the idea that there is no progressive disease. In the meantime it is more likely to be related to his increased levels of stress. I have discussed potential psychotherapy but they declined that. They will try some mindfulness sizes, they will try a few trips planning. \"

## 2023-08-01 ENCOUNTER — OFFICE VISIT (OUTPATIENT)
Dept: FAMILY MEDICINE CLINIC | Facility: CLINIC | Age: 68
End: 2023-08-01

## 2023-08-01 VITALS
RESPIRATION RATE: 16 BRPM | WEIGHT: 181 LBS | SYSTOLIC BLOOD PRESSURE: 132 MMHG | BODY MASS INDEX: 32 KG/M2 | HEART RATE: 62 BPM | DIASTOLIC BLOOD PRESSURE: 72 MMHG | TEMPERATURE: 98 F

## 2023-08-01 DIAGNOSIS — R45.86 MOOD CHANGES: ICD-10-CM

## 2023-08-01 DIAGNOSIS — R41.3 MEMORY LOSS: Primary | ICD-10-CM

## 2023-08-01 DIAGNOSIS — I10 PRIMARY HYPERTENSION: ICD-10-CM

## 2023-08-01 DIAGNOSIS — Z82.49 FAMILY HISTORY OF ANEURYSM OF BLOOD VESSEL OF BRAIN: ICD-10-CM

## 2023-08-01 PROCEDURE — 3075F SYST BP GE 130 - 139MM HG: CPT | Performed by: FAMILY MEDICINE

## 2023-08-01 PROCEDURE — 1126F AMNT PAIN NOTED NONE PRSNT: CPT | Performed by: FAMILY MEDICINE

## 2023-08-01 PROCEDURE — 3078F DIAST BP <80 MM HG: CPT | Performed by: FAMILY MEDICINE

## 2023-08-01 PROCEDURE — 1159F MED LIST DOCD IN RCRD: CPT | Performed by: FAMILY MEDICINE

## 2023-08-01 PROCEDURE — 1160F RVW MEDS BY RX/DR IN RCRD: CPT | Performed by: FAMILY MEDICINE

## 2023-08-01 PROCEDURE — 99214 OFFICE O/P EST MOD 30 MIN: CPT | Performed by: FAMILY MEDICINE

## 2023-08-01 RX ORDER — MULTIVIT-MIN/IRON/FOLIC ACID/K 18-600-40
CAPSULE ORAL
COMMUNITY

## 2023-08-01 RX ORDER — MULTIVIT-MIN/IRON FUM/FOLIC AC 7.5 MG-4
1 TABLET ORAL DAILY
COMMUNITY

## 2023-10-04 ENCOUNTER — HOSPITAL ENCOUNTER (OUTPATIENT)
Dept: MRI IMAGING | Facility: HOSPITAL | Age: 68
Discharge: HOME | End: 2023-10-04
Attending: FAMILY MEDICINE
Payer: MEDICARE

## 2023-10-04 DIAGNOSIS — R45.86 MOOD CHANGES: ICD-10-CM

## 2023-10-04 DIAGNOSIS — R41.3 MEMORY LOSS: ICD-10-CM

## 2023-10-04 DIAGNOSIS — Z82.49 FAMILY HISTORY OF ANEURYSM OF BLOOD VESSEL OF BRAIN: ICD-10-CM

## 2023-10-04 PROCEDURE — A9575 INJ GADOTERATE MEGLUMI 0.1ML: HCPCS | Performed by: FAMILY MEDICINE

## 2023-10-04 PROCEDURE — 70553 MRI BRAIN STEM W/O & W/DYE: CPT | Performed by: FAMILY MEDICINE

## 2023-10-04 PROCEDURE — 70549 MR ANGIOGRAPH NECK W/O&W/DYE: CPT | Performed by: FAMILY MEDICINE

## 2023-10-04 PROCEDURE — 70544 MR ANGIOGRAPHY HEAD W/O DYE: CPT | Performed by: FAMILY MEDICINE

## 2023-10-04 RX ORDER — GADOTERATE MEGLUMINE 376.9 MG/ML
20 INJECTION INTRAVENOUS
Status: COMPLETED | OUTPATIENT
Start: 2023-10-04 | End: 2023-10-04

## 2023-10-04 RX ADMIN — GADOTERATE MEGLUMINE 17 ML: 376.9 INJECTION INTRAVENOUS at 07:58:00

## 2023-12-02 ENCOUNTER — HOSPITAL ENCOUNTER (OUTPATIENT)
Age: 68
Discharge: HOME | End: 2023-12-02
Payer: MEDICARE

## 2023-12-02 VITALS
TEMPERATURE: 100 F | OXYGEN SATURATION: 99 % | HEART RATE: 88 BPM | RESPIRATION RATE: 18 BRPM | SYSTOLIC BLOOD PRESSURE: 134 MMHG | DIASTOLIC BLOOD PRESSURE: 79 MMHG

## 2023-12-02 DIAGNOSIS — U07.1 COVID-19: Primary | ICD-10-CM

## 2023-12-02 LAB — SARS-COV-2 RNA RESP QL NAA+PROBE: DETECTED

## 2023-12-02 RX ORDER — BENZONATATE 100 MG/1
100 CAPSULE ORAL 3 TIMES DAILY PRN
Qty: 30 CAPSULE | Refills: 0 | Status: SHIPPED | OUTPATIENT
Start: 2023-12-02

## 2023-12-02 NOTE — ED INITIAL ASSESSMENT (HPI)
Pt with worsening cough for 1.5 wks with intermittent headache and congestion; denies fever or chest congestion

## 2023-12-02 NOTE — DISCHARGE INSTRUCTIONS
Stop taking your simvastatin while taking Paxlovid. Do not resume taking your simvastatin until 5 days after completion of Paxlovid. Isolate for 5 days. After the 5 days you can come out of isolation but if you continues to have symptoms it is recommended that you wear your mask for 5 additional days.

## 2024-01-04 NOTE — TELEPHONE ENCOUNTER
Scheduled for:  Colonoscopy 424[address]  Provider Name: Dr. Angelina Costa   Date:  1/3/20  Location:  [facility]  Sedation:  IV  Time:   0730 (pt is aware to arrive at 0630)   Prep:  Trilyte, sent via [facility] [place] [address]  Physician reviewed   Diagnosis wit [Negative] : Heme/Lymph

## 2024-03-27 NOTE — LETTER
May 19, 2021         Marko Fraire [facility]      Patient: Stephanie Middleton   YOB: 1955   Date of Visit: 5/19/2021       Dear Dr. Kirby Galindo,    I saw your patient, Stephanie Middleton, on 5/19/2021.
No

## 2024-04-16 ENCOUNTER — LAB ENCOUNTER (OUTPATIENT)
Dept: LAB | Age: 69
End: 2024-04-16
Attending: FAMILY MEDICINE
Payer: MEDICARE

## 2024-04-16 ENCOUNTER — OFFICE VISIT (OUTPATIENT)
Dept: FAMILY MEDICINE CLINIC | Facility: CLINIC | Age: 69
End: 2024-04-16
Payer: MEDICARE

## 2024-04-16 VITALS
DIASTOLIC BLOOD PRESSURE: 84 MMHG | RESPIRATION RATE: 16 BRPM | TEMPERATURE: 97 F | BODY MASS INDEX: 32.43 KG/M2 | SYSTOLIC BLOOD PRESSURE: 132 MMHG | HEIGHT: 63 IN | HEART RATE: 71 BPM | WEIGHT: 183 LBS

## 2024-04-16 DIAGNOSIS — E78.5 HYPERLIPIDEMIA, UNSPECIFIED HYPERLIPIDEMIA TYPE: ICD-10-CM

## 2024-04-16 DIAGNOSIS — I10 ESSENTIAL HYPERTENSION: ICD-10-CM

## 2024-04-16 DIAGNOSIS — Z00.00 ENCOUNTER FOR ANNUAL HEALTH EXAMINATION: Primary | ICD-10-CM

## 2024-04-16 DIAGNOSIS — Z00.00 ENCOUNTER FOR ANNUAL HEALTH EXAMINATION: ICD-10-CM

## 2024-04-16 DIAGNOSIS — Z86.010 HISTORY OF COLON POLYPS: ICD-10-CM

## 2024-04-16 LAB
ALBUMIN SERPL-MCNC: 4.4 G/DL (ref 3.2–4.8)
ALBUMIN/GLOB SERPL: 1.6 {RATIO} (ref 1–2)
ALP LIVER SERPL-CCNC: 42 U/L
ALT SERPL-CCNC: 17 U/L
ANION GAP SERPL CALC-SCNC: 3 MMOL/L (ref 0–18)
AST SERPL-CCNC: 17 U/L (ref ?–34)
BILIRUB SERPL-MCNC: 0.9 MG/DL (ref 0.2–1.1)
BUN BLD-MCNC: 22 MG/DL (ref 9–23)
BUN/CREAT SERPL: 19.8 (ref 10–20)
CALCIUM BLD-MCNC: 9.7 MG/DL (ref 8.7–10.4)
CHLORIDE SERPL-SCNC: 107 MMOL/L (ref 98–112)
CHOLEST SERPL-MCNC: 149 MG/DL (ref ?–200)
CO2 SERPL-SCNC: 28 MMOL/L (ref 21–32)
COMPLEXED PSA SERPL-MCNC: 0.99 NG/ML (ref ?–4)
CREAT BLD-MCNC: 1.11 MG/DL
DEPRECATED RDW RBC AUTO: 42.6 FL (ref 35.1–46.3)
EGFRCR SERPLBLD CKD-EPI 2021: 71 ML/MIN/1.73M2 (ref 60–?)
ERYTHROCYTE [DISTWIDTH] IN BLOOD BY AUTOMATED COUNT: 12.8 % (ref 11–15)
FASTING PATIENT LIPID ANSWER: YES
FASTING STATUS PATIENT QL REPORTED: YES
GLOBULIN PLAS-MCNC: 2.7 G/DL (ref 2.8–4.4)
GLUCOSE BLD-MCNC: 100 MG/DL (ref 70–99)
HCT VFR BLD AUTO: 45.3 %
HDLC SERPL-MCNC: 46 MG/DL (ref 40–59)
HGB BLD-MCNC: 15.9 G/DL
LDLC SERPL CALC-MCNC: 81 MG/DL (ref ?–100)
MCH RBC QN AUTO: 32 PG (ref 26–34)
MCHC RBC AUTO-ENTMCNC: 35.1 G/DL (ref 31–37)
MCV RBC AUTO: 91.1 FL
NONHDLC SERPL-MCNC: 103 MG/DL (ref ?–130)
OSMOLALITY SERPL CALC.SUM OF ELEC: 289 MOSM/KG (ref 275–295)
PLATELET # BLD AUTO: 183 10(3)UL (ref 150–450)
POTASSIUM SERPL-SCNC: 3.9 MMOL/L (ref 3.5–5.1)
PROT SERPL-MCNC: 7.1 G/DL (ref 5.7–8.2)
RBC # BLD AUTO: 4.97 X10(6)UL
SODIUM SERPL-SCNC: 138 MMOL/L (ref 136–145)
TRIGL SERPL-MCNC: 124 MG/DL (ref 30–149)
TSI SER-ACNC: 2.76 MIU/ML (ref 0.55–4.78)
VLDLC SERPL CALC-MCNC: 19 MG/DL (ref 0–30)
WBC # BLD AUTO: 7.4 X10(3) UL (ref 4–11)

## 2024-04-16 PROCEDURE — 85027 COMPLETE CBC AUTOMATED: CPT

## 2024-04-16 PROCEDURE — 36415 COLL VENOUS BLD VENIPUNCTURE: CPT

## 2024-04-16 PROCEDURE — 80053 COMPREHEN METABOLIC PANEL: CPT

## 2024-04-16 PROCEDURE — 84443 ASSAY THYROID STIM HORMONE: CPT

## 2024-04-16 PROCEDURE — 80061 LIPID PANEL: CPT

## 2024-04-16 RX ORDER — SIMVASTATIN 20 MG
20 TABLET ORAL DAILY
Qty: 90 TABLET | Refills: 3 | Status: SHIPPED | OUTPATIENT
Start: 2024-04-16

## 2024-04-16 RX ORDER — HYDROCHLOROTHIAZIDE 25 MG/1
25 TABLET ORAL DAILY
Qty: 90 TABLET | Refills: 3 | Status: SHIPPED | OUTPATIENT
Start: 2024-04-16

## 2024-04-16 NOTE — PROGRESS NOTES
Subjective:   Jose Rafael Oscar is a 68 year old male who presents for a Medicare Subsequent Annual Wellness visit (Pt already had Initial Annual Wellness) and scheduled follow up of multiple significant but stable problems.       68 yr old male who presents for Medicare physical.  with 2 daughters.  Mother is 97 and lives in [place]. Goes to [employer] 5 times per week.  Eating healthy.     BP high today but patient was nervous.  Denies chest pain, SOB, palpitations, edema.      Saw Neurology for memory loss last year but no cause was found.  Seemed to be due to psychological issues.  Marital concerns have resolved.     Wears glasses.  Normal hearing.     Colonoscopy is up to date.  Due in 2030.     Normal urination.  Gets up once per night.     History/Other:   Fall Risk Assessment:   He has been screened for Falls and is low risk.      Cognitive Assessment:   Abnormal  What day of the week is this?: Correct  What month is it?: Correct  What year is it?: Correct  Recall \"Ball\": Correct  Recall \"Flag\": Incorrect  Recall \"Tree\": Correct    Functional Ability/Status:   Jose Rafael Oscar has a completely normal functional assessment. See flowsheet for details.        Depression Screening (PHQ-2/PHQ-9): PHQ-2 SCORE: 0  , done 4/16/2024   Last [place] Suicide Screening on 4/16/2024 was No Risk.          Advanced Directives:   He does NOT have a Living Will. [Do you have a living will?: No]  He does NOT have a Power of  for Health Care. [Do you have a healthcare power of ?: No]  Discussed Advance Care Planning with patient (and family/surrogate if present). Standard forms made available to patient in After Visit Summary.      Patient Active Problem List   Diagnosis    Hyperlipidemia    History of colon polyps    Essential hypertension     Allergies:  He has No Known Allergies.    Current Medications:  Outpatient Medications Marked as Taking for the 4/16/24 encounter (Office Visit) with  Weiler, Colleen M, DO   Medication Sig    hydroCHLOROthiazide 25 MG Oral Tab Take 1 tablet (25 mg total) by mouth daily.    simvastatin 20 MG Oral Tab Take 1 tablet (20 mg total) by mouth daily.       Medical History:  He  has a past medical history of Colon polyp (2003), Colon polyps, Cyst of neck, Essential hypertension (2018), High blood pressure, High cholesterol, Hyperlipidemia, and Torn rotator cuff (1999).  Surgical History:  He  has a past surgical history that includes tonsillectomy (1971); repair rotator cuff,chronic (Right, 2011); exc skin benig 2.1-3cm remaindr body (Right, 3/22/17); repr cmpl wnd head,fac,hand 2.6-7.5 (Right, 3/22/17); colonoscopy (2003 then every 5 years); colonoscopy (N/A, 6/24/2019); colonoscopy; and colonoscopy (N/A, 1/3/2020).   Family History:  His family history includes Cancer in his paternal grandmother; Colon Cancer (age of onset: 61) in his cousin; Heart Disease (age of onset: 65) in his mother; Lipids in an other family member; Rash in his sister; brain aneurysm (age of onset: 47) in his father.  Social History:  He  reports that he has never smoked. He has never used smokeless tobacco. He reports current alcohol use. He reports that he does not use drugs.    Tobacco:  He has never smoked tobacco.    CAGE Alcohol Screen:   He has been screened for alcohol abuse and his score is not 0:  Cut: Have you ever felt you should Cut down on your drinking?: Yes  Annoyed: Have people Annoyed you by criticizing your drinking?: No  Guilty: Have you ever felt bad or Guilty about your drinking?: No  Eye Opener: Have you ever had a drink first thing in the morning to steady your nerves or to get rid of a hangover (Eye opener)?: No  Total Score: 1      Patient Care Team:  Weiler, Colleen M, DO as PCP - General (Family Medicine)  Juvencio Gaviria MD (NEUROLOGY)    Review of Systems  ROS:   Allergic/Immuno:  Negative for environmental allergies and food allergies  Cardiovascular:   Negative for chest pain and irregular heartbeat/palpitations  Constitutional:  Negative for decreased activity, fever, irritability and lethargy  Endocrine:  Negative for abnormal sleep patterns, increased activity, polydipsia and polyphagia  ENMT:  Negative for ear drainage, hearing loss and nasal drainage  Eyes:  Negative for eye discharge and vision loss  Gastrointestinal:  Negative for abdominal pain, constipation, decreased appetite, diarrhea and vomiting  Genitourinary:  Negative for dysuria and hematuria  Hema/Lymph:  Negative for easy bleeding and easy bruising  Integumentary:  Negative for pruritus and rash  Musculoskeletal:  Negative for bone/joint symptoms  Neurological:  Negative for gait disturbance  Psychiatric:  Negative for inappropriate interaction and psychiatric symptoms      Objective:   Physical Exam     /84   Pulse 71   Temp 97.4 °F (36.3 °C) (Temporal)   Resp 16   Ht 5' 3\" (1.6 m)   Wt 183 lb (83 kg)   BMI 32.42 kg/m²  Estimated body mass index is 32.42 kg/m² as calculated from the following:    Height as of this encounter: 5' 3\" (1.6 m).    Weight as of this encounter: 183 lb (83 kg).    Medicare Hearing Assessment:   Hearing Screening    Screening Method: Questionnaire  I have a problem hearing over the telephone: No I have trouble following the conversations when two or more people are talking at the same time: No   I have trouble understanding things on the TV: No I have to strain to understand conversations: No   I have to worry about missing the telephone ring or doorbell: No I have trouble hearing conversations in a noisy background such as a crowded room or restaurant: No   I get confused about where sounds come from: No I misunderstand some words in a sentence and need to ask people to repeat themselves: No   I especially have trouble understanding the speech of women and children: No I have trouble understanding the speaker in a large room such as at a meeting or place of  Orthodoxy: No   Many people I talk to seem to mumble (or don't speak clearly): No People get annoyed because I misunderstand what they say: No   I misunderstand what others are saying and make inappropriate responses: No I avoid social activities because I cannot hear well and fear I will reply improperly: No   Family members and friends have told me they think I may have hearing loss: No             Visual Acuity:   Right Eye Visual Acuity: Corrected Right Eye Chart Acuity: 20/25   Left Eye Visual Acuity: Corrected Left Eye Chart Acuity: 20/25   Both Eyes Visual Acuity: Corrected Both Eyes Chart Acuity: 20/25   Able To Tolerate Visual Acuity: Yes    Gen:  Well-nourished.  No distress.  HEENT: Conjunctive clear.  Juan ear canals clear.  Juan TMs intact with good landmarks noted.  Nares patent.  Oral mucous membrane moist.  Normal lips, teeth, and gums.  Oropharynx normal.  Neck supple.  Good ROM.  No LAD.  Thyroid normal.  CV:  Regular rate and rhythm; no murmurs  Lungs:  Clear to ausculation; good aeration               No wheezes, rales or rhonchi  Abd: soft, non-tender, non-distended          Normal bowel sounds; no masses          No hepatosplenomegaly  Extremities: No cyanosis, clubbing, edema.  Pedal pulses 2+ juan.        Assessment & Plan:   Jose Rafael Oscar is a 68 year old male who presents for a Medicare Assessment.     1. Encounter for annual health examination (Primary)    Exercising regularly. Labs done.   -     CBC, Platelet; No Differential; Future; Expected date: 04/16/2024  -     Comp Metabolic Panel (14); Future; Expected date: 04/16/2024  -     Lipid Panel; Future; Expected date: 04/16/2024  -     Assay, Thyroid Stim Hormone; Future; Expected date: 04/16/2024  -     PSA Total, Screen; Future; Expected date: 04/16/2024  2. Essential hypertension    Controlled.  CPM    3. Hyperlipidemia, unspecified hyperlipidemia type    On statin.  Check lipid panel.     4. History of colon polyps    Up to date  on colonoscopy.     Other orders  -     hydroCHLOROthiazide; Take 1 tablet (25 mg total) by mouth daily.  Dispense: 90 tablet; Refill: 3  -     Simvastatin; Take 1 tablet (20 mg total) by mouth daily.  Dispense: 90 tablet; Refill: 3    The patient indicates understanding of these issues and agrees to the plan.  Reinforced healthy diet, lifestyle, and exercise.      Return in 6 months (on 10/16/2024).     Colleen Weiler, DO, 4/16/2024     Supplementary Documentation:   General Health:  In the past six months, have you lost more than 10 pounds without trying?: 2 - No  Has your appetite been poor?: No  Type of Diet: Balanced  How does the patient maintain a good energy level?: Appropriate Exercise  How would you describe your daily physical activity?: Moderate  How would you describe your current health state?: Good  How do you maintain positive mental well-being?: Social Interaction  On a scale of 0 to 10, with 0 being no pain and 10 being severe pain, what is your pain level?: 0 - (None)  In the past six months, have you experienced urine leakage?: 0-No  At any time do you feel concerned for the safety/well-being of yourself and/or your children, in your home or elsewhere?: No  Have you had any immunizations at another office such as Influenza, Hepatitis B, Tetanus, or Pneumococcal?: No        Jose Rafael Oscar's SCREENING SCHEDULE   Tests on this list are recommended by your physician but may not be covered, or covered at this frequency, by your insurer.   Please check with your insurance carrier before scheduling to verify coverage.   PREVENTATIVE SERVICES FREQUENCY &  COVERAGE DETAILS LAST COMPLETION DATE   Diabetes Screening    Fasting Blood Sugar / Glucose    One screening every 12 months if never tested or if previously tested but not diagnosed with pre-diabetes   One screening every 6 months if diagnosed with pre-diabetes Lab Results   Component Value Date    GLU 96 04/18/2023        Cardiovascular Disease  Screening    Lipid Panel  Cholesterol  Lipoprotein (HDL)  Triglycerides Covered every 5 years for all Medicare beneficiaries without apparent signs or symptoms of cardiovascular disease Lab Results   Component Value Date    CHOLEST 142 04/18/2023    HDL 42 04/18/2023    LDL 79 04/18/2023    TRIG 118 04/18/2023         Electrocardiogram (EKG)   Covered if needed at Welcome to Medicare, and non-screening if indicated for medical reasons 01/21/2019      Ultrasound Screening for Abdominal Aortic Aneurysm (AAA) Covered once in a lifetime for one of the following risk factors    Men who are 65-75 years old and have ever smoked    Anyone with a family history -     Colorectal Cancer Screening  Covered for ages 50-85; only need ONE of the following:    Colonoscopy   Covered every 10 years    Covered every 2 years if patient is at high risk or previous colonoscopy was abnormal 01/03/2020    Health Maintenance   Topic Date Due    Colorectal Cancer Screening  01/03/2030       Flexible Sigmoidoscopy   Covered every 4 years -    Fecal Occult Blood Test Covered annually -   Prostate Cancer Screening    Prostate-Specific Antigen (PSA) Annually Lab Results   Component Value Date    PSA 0.5 08/28/2018     Health Maintenance   Topic Date Due    PSA  08/28/2020      Immunizations    Influenza Covered once per flu season  Please get every year 10/24/2023  No recommendations at this time    Pneumococcal Each vaccine ([id number] & [id number]) covered once after 65 Prevnar 13: 03/15/2022    [id number]: -     No recommendations at this time    Hepatitis B One screening covered for patients with certain risk factors   -  No recommendations at this time    Tetanus Toxoid Not covered by Medicare Part B unless medically necessary (cut with metal); may be covered with your pharmacy prescription benefits 05/01/2009    Tetanus, Diptheria and Pertusis TD and TDaP Not covered by Medicare Part B -  No recommendations at this time    Zoster Not  covered by Medicare Part B; may be covered with your pharmacy  prescription benefits -  No recommendations at this time     Annual Monitoring of Persistent Medications (ACE/ARB, digoxin diuretics, anticonvulsants)    Potassium Annually Lab Results   Component Value Date    K 3.3 (L) 04/18/2023         Creatinine   Annually Lab Results   Component Value Date    CREATSERUM 1.04 04/18/2023         BUN Annually Lab Results   Component Value Date    BUN 21 04/18/2023       Drug Serum Conc Annually No results found for: \"DIGOXIN\", \"DIG\", \"VALP\"

## 2024-04-16 NOTE — PATIENT INSTRUCTIONS
Jose Rafael Oscar's SCREENING SCHEDULE   Tests on this list are recommended by your physician but may not be covered, or covered at this frequency, by your insurer.   Please check with your insurance carrier before scheduling to verify coverage.   PREVENTATIVE SERVICES FREQUENCY &  COVERAGE DETAILS LAST COMPLETION DATE   Diabetes Screening    Fasting Blood Sugar / Glucose    One screening every 12 months if never tested or if previously tested but not diagnosed with pre-diabetes   One screening every 6 months if diagnosed with pre-diabetes Lab Results   Component Value Date    GLU 96 04/18/2023        Cardiovascular Disease Screening    Lipid Panel  Cholesterol  Lipoprotein (HDL)  Triglycerides Covered every 5 years for all Medicare beneficiaries without apparent signs or symptoms of cardiovascular disease Lab Results   Component Value Date    CHOLEST 142 04/18/2023    HDL 42 04/18/2023    LDL 79 04/18/2023    TRIG 118 04/18/2023         Electrocardiogram (EKG)   Covered if needed at Welcome to Medicare, and non-screening if indicated for medical reasons 01/21/2019      Ultrasound Screening for Abdominal Aortic Aneurysm (AAA) Covered once in a lifetime for one of the following risk factors   • Men who are 65-75 years old and have ever smoked   • Anyone with a family history -     Colorectal Cancer Screening  Covered for ages 50-85; only need ONE of the following:    Colonoscopy   Covered every 10 years    Covered every 2 years if patient is at high risk or previous colonoscopy was abnormal 01/03/2020    Health Maintenance   Topic Date Due   • Colorectal Cancer Screening  01/03/2030       Flexible Sigmoidoscopy   Covered every 4 years -    Fecal Occult Blood Test Covered annually -   Prostate Cancer Screening    Prostate-Specific Antigen (PSA) Annually Lab Results   Component Value Date    PSA 0.5 08/28/2018     Health Maintenance   Topic Date Due   • PSA  08/28/2020      Immunizations    Influenza Covered once  per flu season  Please get every year 10/24/2023  No recommendations at this time    Pneumococcal Each vaccine ([id number] & [id number]) covered once after 65 Prevnar 13: 03/15/2022    [id number]: -     No recommendations at this time    Hepatitis B One screening covered for patients with certain risk factors   -  No recommendations at this time    Tetanus Toxoid Not covered by Medicare Part B unless medically necessary (cut with metal); may be covered with your pharmacy prescription benefits 05/01/2009    Tetanus, Diptheria and Pertusis TD and TDaP Not covered by Medicare Part B -  No recommendations at this time    Zoster Not covered by Medicare Part B; may be covered with your pharmacy  prescription benefits -  No recommendations at this time     Annual Monitoring of Persistent Medications (ACE/ARB, digoxin diuretics, anticonvulsants)    Potassium Annually Lab Results   Component Value Date    K 3.3 (L) 04/18/2023         Creatinine   Annually Lab Results   Component Value Date    CREATSERUM 1.04 04/18/2023         BUN Annually Lab Results   Component Value Date    BUN 21 04/18/2023       Drug Serum Conc Annually No results found for: \"DIGOXIN\", \"DIG\", \"VALP\"

## 2024-05-17 ENCOUNTER — OFFICE VISIT (OUTPATIENT)
Dept: OTOLARYNGOLOGY | Facility: CLINIC | Age: 69
End: 2024-05-17

## 2024-05-17 VITALS — WEIGHT: 180 LBS | BODY MASS INDEX: 30.73 KG/M2 | HEIGHT: 64 IN

## 2024-05-17 DIAGNOSIS — H61.23 BILATERAL IMPACTED CERUMEN: Primary | ICD-10-CM

## 2024-05-17 PROCEDURE — 1160F RVW MEDS BY RX/DR IN RCRD: CPT | Performed by: OTOLARYNGOLOGY

## 2024-05-17 PROCEDURE — 1159F MED LIST DOCD IN RCRD: CPT | Performed by: OTOLARYNGOLOGY

## 2024-05-17 PROCEDURE — 3008F BODY MASS INDEX DOCD: CPT | Performed by: OTOLARYNGOLOGY

## 2024-05-17 PROCEDURE — 69210 REMOVE IMPACTED EAR WAX UNI: CPT | Performed by: OTOLARYNGOLOGY

## 2024-05-17 NOTE — PROGRESS NOTES
Jose Rafael Oscar is a 68 year old male.    Chief Complaint   Patient presents with    Ear Wax     Patient is here for bilateral ear wax cleaning.       HISTORY OF PRESENT ILLNESS    Patient presents for cerumen removal. No other complaints or concerns at this time    Social History     Socioeconomic History    Marital status:    Tobacco Use    Smoking status: Never    Smokeless tobacco: Never   Vaping Use    Vaping status: Never Used   Substance and Sexual Activity    Alcohol use: Yes     Comment: Socially    Drug use: No   Other Topics Concern    Caffeine Concern Yes     Comment: soda       Family History   Problem Relation Age of Onset    Other (brain aneurysm) Father 47        47 onset (cause of death)    Colon Cancer Cousin 61        onset 61 (cause of death)    Heart Disease Mother 65        CAD, CABG x4, onset @ 65    Rash Sister         scleroderma    Lipids Other         hyperlipidemia, family h/o    Cancer Paternal Grandmother         She  from it       Past Medical History:    Colon polyp    Pathology came back negative    Colon polyps    polyps, colon    Cyst of neck    3/22/17 Right Neck Cyst    Essential hypertension    Taking Hydrochlorothiazide 12.5 mg    High blood pressure    High cholesterol    Hyperlipidemia    Torn rotator cuff    rotator cuff tear (right).   Rotator cuff repair       Past Surgical History:   Procedure Laterality Date    Colonoscopy   then every 5 years    Colonoscopy N/A 2019    Procedure: COLONOSCOPY;  Surgeon: Lui Humphrey MD;  Location: [facility] ENDOSCOPY    Colonoscopy      Colonoscopy N/A 1/3/2020    Procedure: COLONOSCOPY;  Surgeon: Lui Humphrey MD;  Location: [facility] ENDOSCOPY    Exc skin benig 2.1-3cm remaindr body Right 3/22/17    Repair rotator cuff,chronic Right     rotator cuff repair.   rotator cuff tear (right).  Lawrence Heredia    Repr cmpl wnd head,fac,hand 2.6-7.5 Right 3/22/17    Tonsillectomy  1971       REVIEW OF  SYSTEMS    System Neg/Pos Details   Constitutional Negative Fatigue, fever and weight loss.   ENMT Negative Drooling.   Eyes Negative Blurred vision and vision changes.   Respiratory Negative Dyspnea and wheezing.   Cardio Negative Chest pain, irregular heartbeat/palpitations and syncope.   GI Negative Abdominal pain and diarrhea.   Endocrine Negative Cold intolerance and heat intolerance.   Neuro Negative Tremors.   Psych Negative Anxiety and depression.   Integumentary Negative Frequent skin infections, pigment change and rash.   Hema/Lymph Negative Easy bleeding and easy bruising.           PHYSICAL EXAM    Ht 5' 4\" (1.626 m)   Wt 180 lb (81.6 kg)   BMI 30.90 kg/m²        Constitutional Normal Overall appearance - Normal.        Neck Exam Normal Inspection - Normal. Palpation - Normal. Parotid gland - Normal. Thyroid gland - Normal.             Head/Face Normal Facial features - Normal. Eyebrows - Normal. Skull - Normal.             Ears Normal Inspection - Right: Normal, Left: Normal. Canal - Right: Normal, Left: Normal. TM - Right: Normal, Left: Normal.   Skin Normal Inspection - Normal.                              Canals:  Right: Canal reveals cerumen impaction,   Left: Canal reveals cerumen impaction,     Tympanic Membranes:  Right: Normal tympanic membrane.   Left: Normal tympanic membrane.     TM Visualized Method:   Right TM examined via otomicroscopy.    Left TM examined via otomicroscopy.      PROCEDURE:    Removal of cerumen impaction   The cerumen impaction was completely removed using microscopy.   Removal was completed by using acurette and/or suction.   Comments: Return to clinic as needed.  Avoid q-tips, water precautions and use over the counter wax remedies as needed.      Current Outpatient Medications:     hydroCHLOROthiazide 25 MG Oral Tab, Take 1 tablet (25 mg total) by mouth daily., Disp: 90 tablet, Rfl: 3    simvastatin 20 MG Oral Tab, Take 1 tablet (20 mg total) by mouth daily., Disp:  90 tablet, Rfl: 3    Linolenic Acid (OMEGA 3 OR), Take by mouth., Disp: , Rfl:     Multiple Vitamins-Minerals (MULTI-VITAMIN/MINERALS) Oral Tab, Take 1 tablet by mouth daily. Focus factor, Disp: , Rfl:     aspirin 81 MG Oral Tab, Take 1 tablet (81 mg total) by mouth daily., Disp: , Rfl:   ASSESSMENT AND PLAN    1. Bilateral impacted cerumen        All cerumen was removed using microscopy. I have asked the patient to return to see me as needed for repeat cerumen removal in the future.      Jarad Ramirez MD    5/17/2024    9:55 AM

## 2024-08-24 PROCEDURE — 99283 EMERGENCY DEPT VISIT LOW MDM: CPT

## 2024-08-24 PROCEDURE — 99284 EMERGENCY DEPT VISIT MOD MDM: CPT

## 2024-08-25 ENCOUNTER — HOSPITAL ENCOUNTER (EMERGENCY)
Facility: HOSPITAL | Age: 69
Discharge: HOME | End: 2024-08-25
Attending: EMERGENCY MEDICINE
Payer: MEDICARE

## 2024-08-25 ENCOUNTER — APPOINTMENT (OUTPATIENT)
Dept: GENERAL RADIOLOGY | Facility: HOSPITAL | Age: 69
End: 2024-08-25
Attending: EMERGENCY MEDICINE
Payer: MEDICARE

## 2024-08-25 VITALS
HEIGHT: 64 IN | BODY MASS INDEX: 31.58 KG/M2 | TEMPERATURE: 97 F | WEIGHT: 185 LBS | RESPIRATION RATE: 18 BRPM | HEART RATE: 63 BPM | OXYGEN SATURATION: 98 % | DIASTOLIC BLOOD PRESSURE: 85 MMHG | SYSTOLIC BLOOD PRESSURE: 151 MMHG

## 2024-08-25 DIAGNOSIS — S76.312A STRAIN OF LEFT HAMSTRING MUSCLE, INITIAL ENCOUNTER: ICD-10-CM

## 2024-08-25 DIAGNOSIS — S39.011A STRAIN OF ABDOMINAL MUSCLE, INITIAL ENCOUNTER: Primary | ICD-10-CM

## 2024-08-25 PROCEDURE — 71101 X-RAY EXAM UNILAT RIBS/CHEST: CPT | Performed by: EMERGENCY MEDICINE

## 2024-08-25 RX ORDER — IBUPROFEN 600 MG/1
600 TABLET, FILM COATED ORAL EVERY 8 HOURS PRN
Qty: 30 TABLET | Refills: 0 | Status: SHIPPED | OUTPATIENT
Start: 2024-08-25 | End: 2024-09-01

## 2024-08-25 RX ORDER — HYDROCODONE BITARTRATE AND ACETAMINOPHEN 5; 325 MG/1; MG/1
1 TABLET ORAL EVERY 6 HOURS PRN
Qty: 16 TABLET | Refills: 0 | Status: SHIPPED | OUTPATIENT
Start: 2024-08-25 | End: 2024-09-01

## 2024-08-25 RX ORDER — HYDROCODONE BITARTRATE AND ACETAMINOPHEN 5; 325 MG/1; MG/1
1 TABLET ORAL ONCE
Status: COMPLETED | OUTPATIENT
Start: 2024-08-25 | End: 2024-08-25

## 2024-08-25 NOTE — ED QUICK NOTES
Rounding Completed. Patient sleeping.    Plan of Care reviewed. Waiting for xray to be read.  Elimination needs assessed.    Bed is locked and in lowest position. Call light within reach.

## 2024-08-25 NOTE — ED PROVIDER NOTES
Patient Seen in: [facility] Emergency Department    History     Chief Complaint   Patient presents with    Back Pain       HPI    69-year-old male with past medical history significant for high blood pressure, high cholesterol, presents to the ED for evaluation of pain in his left hamstring as well as in his left lower chest wall.  Patient's wife states that he was in [place] on vacation and believes he had caused a mild injury to his left hamstring when he got up on stage for a show.  He states yesterday which was 3 days after the show, he was in the water swimming with dolphins and the Dolphin was pushing his feet propelling him forward.  He states that somehow the adult friend pushed him to the side and he twisted.  He felt a sharp pain in his left hamstring as well as a pop and sharp pain in his left lower chest wall.  He states since that he has had significant discomfort and difficulty sleeping.  He returned during the daytime from [place] and had taken some Toradol given to him at the resort for pain with minimal relief    History from Independent Source: Patient's wife states that patient had initially injured his hamstring while on stage for a show 3 days prior to the golf and show.    External Records Reviewed:     History reviewed.   Past Medical History:    Colon polyp    Pathology came back negative    Colon polyps    polyps, colon    Cyst of neck    3/22/17 Right Neck Cyst    Essential hypertension    Taking Hydrochlorothiazide 12.5 mg    High blood pressure    High cholesterol    Hyperlipidemia    Torn rotator cuff    rotator cuff tear (right).  2011 Rotator cuff repair       History reviewed.   Past Surgical History:   Procedure Laterality Date    Colonoscopy  2003 then every 5 years    Colonoscopy N/A 6/24/2019    Procedure: COLONOSCOPY;  Surgeon: Lui Humphrey MD;  Location: [facility] ENDOSCOPY    Colonoscopy      Colonoscopy N/A 1/3/2020    Procedure: COLONOSCOPY;  Surgeon: Milagro  MD Lui;  Location: [facility] ENDOSCOPY    Exc skin benig 2.1-3cm remaindr body Right 3/22/17    Repair rotator cuff,chronic Right     rotator cuff repair.   rotator cuff tear (right).  Lawrence Heredia    Repr cmpl wnd head,fac,hand 2.6-7.5 Right 3/22/17    Tonsillectomy           Medications :  (Not in a hospital admission)       Family History   Problem Relation Age of Onset    Other (brain aneurysm) Father 47        47 onset (cause of death)    Colon Cancer Cousin 61        onset 61 (cause of death)    Heart Disease Mother 65        CAD, CABG x4, onset @ 65    Rash Sister         scleroderma    Lipids Other         hyperlipidemia, family h/o    Cancer Paternal Grandmother         She  from it       Smoking Status:   Social History     Socioeconomic History    Marital status:    Tobacco Use    Smoking status: Never     Passive exposure: Never    Smokeless tobacco: Never   Vaping Use    Vaping status: Never Used   Substance and Sexual Activity    Alcohol use: Yes     Comment: Socially    Drug use: No   Other Topics Concern    Caffeine Concern Yes     Comment: soda       Constitutional and vital signs reviewed.      Social History and Family History elements reviewed from today, pertinent positives to the presenting problem noted.    Physical Exam     ED Triage Vitals [24 0018]   /81   Pulse 67   Resp 18   Temp 97.1 °F (36.2 °C)   Temp src Temporal   SpO2 97 %   O2 Device None (Room air)       Physical Exam   Constitutional: AAOx3, well nourished, NAD  HEENT: Normocephalic, PERRLA, MMM  CV: s1s2+, RRR, no m/r/g, normal distal pulses  Pulmonary/Chest: CTA b/l with no rales, wheezes.  Tenderness to palpation in the lower left chest wall at the costal margin  Abdominal: Nontender.  Nondistended. Soft. Bowel sounds are normal.   Neck/Back:   :   Musculoskeletal: Normal range of motion. No deformity.  Mild tenderness palpation of the left hamstring muscle.  Pain in the hamstring with hip  flexion.  Neurological: Awake, alert. Normal reflexes. No cranial nerve deficit.    Skin: Skin is warm and dry. No rash noted. No erythema.   Psychiatric:      All measures to prevent infection transmission during my interaction with the patient were taken. The patient was already wearing a droplet mask on my arrival to the room. Personal protective equipment was worn throughout the duration of the exam.      ED Course      Labs Reviewed - No data to display  My Independent Interpretation of EKG (if performed):     Monitor Interpretation:   normal sinus rhythm as interpreted by me.      Imaging Results Available and Reviewed while in ED: No results found.  ED Medications Administered:   Medications   HYDROcodone-acetaminophen (Norco) 5-325 MG per tab 1 tablet (1 tablet Oral Given 8/25/24 0142)             MDM     Vitals:    08/25/24 0018 08/25/24 0023   BP: 132/81    Pulse: 67    Resp: 18    Temp: 97.1 °F (36.2 °C)    TempSrc: Temporal    SpO2: 97%    Weight:  83.9 kg   Height:  162.6 cm (5' 4\")     *I personally reviewed and interpreted all ED vitals.    Independent Interpretation of Studies: I have independently reviewed patient's rib series and there is no evidence of fracture or pneumothorax    Social Determinants of Health:     Procedures:      Differential/MDM/Shared Decision Making: Differential Diagnosis includes rib fracture, muscular strain, hamstring strain, dislocation, fracture, others.      The patient already has high blood pressure, high cholesterol, to contribute to the complexity of this ED evaluation.           Imaging is unremarkable.  I believe patient has muscular strain in his upper abdominal wall at the costal attachments.  He also has pull of his left hamstring muscle.  Discussed case with patient and his wife.  Will discharge on ibuprofen and Norco.  Patient is comfortable with discharge plan.      Condition upon leaving the department: Stable    Disposition and Plan     Clinical  Impression:  1. Strain of abdominal muscle, initial encounter    2. Strain of left hamstring muscle, initial encounter        Disposition:  Discharge    Follow-up:  Weiler, Colleen M, DO  [address] [facility] [address]  [place] [address]  [phone]    Call in 2 day(s)        Medications Prescribed:  Current Discharge Medication List        START taking these medications    Details   ibuprofen 600 MG Oral Tab Take 1 tablet (600 mg total) by mouth every 8 (eight) hours as needed for Pain or Fever.  Qty: 30 tablet, Refills: 0    Associated Diagnoses: Strain of abdominal muscle, initial encounter; Strain of left hamstring muscle, initial encounter      HYDROcodone-acetaminophen 5-325 MG Oral Tab Take 1 tablet by mouth every 6 (six) hours as needed for Pain.  Qty: 16 tablet, Refills: 0    Associated Diagnoses: Strain of abdominal muscle, initial encounter; Strain of left hamstring muscle, initial encounter

## 2024-08-25 NOTE — ED INITIAL ASSESSMENT (HPI)
Pt presents stating that he was in [place] at the dolphin show and the dolphin went to the side and he had a pain in his left left that travels up into his lower back.

## 2024-08-26 ENCOUNTER — PATIENT OUTREACH (OUTPATIENT)
Dept: CASE MANAGEMENT | Age: 69
End: 2024-08-26

## 2024-08-26 DIAGNOSIS — Z02.9 ENCOUNTERS FOR UNSPECIFIED ADMINISTRATIVE PURPOSE: Primary | ICD-10-CM

## 2024-08-26 PROCEDURE — 1111F DSCHRG MED/CURRENT MED MERGE: CPT

## 2024-08-26 NOTE — PROGRESS NOTES
Transitions of Care Navigation  Discharge Date: 24  Contact Date: 2024    Transitions of Care Assessment:  LULA Initial Assessment    General:  Assessment completed with: Patient  Patient Subjective: NCM spoke with pt states he is feeling okay. pt states pain comes and goes, rates pain at 2/10. pt denies any problems getting around at home. He denies any fevers, chills, nausea, vomiting or any other symptoms.  Chief Complaint: Back Pain  Verify patient name and  with patient/ caregiver: Yes    Hospital Stay/Discharge:  Tell me what you understand of why you were in the hospital or emergency department: Pt had injury while on vacation.  Prior to leaving the hospital were your Discharge Instructions reviewed with you?: Yes  Did you receive a copy of your written Discharge Instructions?: Yes  What questions do you have about your Discharge Instructions?: Pt denies any quesitons.  Do you feel better or worse since you left the hospital or emergency department?: Same    Follow - Up Appointment:  Do you have a follow-up appointment?: Yes  Date: 24  Physician: Dr. Weiler-PCP.  Are there any barriers to getting to your follow-up appointment?: No    Home Health/DME:  Prior to leaving the hospital was Home Health (HH) arranged for you?: N/A  Are HH needs identified by staff during the assessment?: No     Prior to leaving the hospital or emergency department was Durable Medical Equipment (DME), medical supplies, or infusions arranged for you?: N/A  Are DME/medical supply/infusions needs identified by staff during this assessment?: No     Medications/Diet:  Did any of your medications change, during or after your hospital stay or ED visit?: Yes  Do you have your new or updated medications?: Yes  Do you understand what your medications are for and possible side effects?: Yes  Are there any reasons that keep you from taking your medication as prescribed?: No  Any concerns about medication refills?: No    Were  you given a different diet per your Discharge Instructions?: No  Reason: N/A     Questions/Concerns:  Do you have any questions or concerns that have not been discussed?: No   LULA Follow-up Assessment    General:  Assessment completed with: Patient  Patient Subjective: NCM spoke with pt states he is feeling okay. pt states pain comes and goes, rates pain at 2/10. pt denies any problems getting around at home. He denies any fevers, chills, nausea, vomiting or any other symptoms.  Chief Complaint: Back Pain  Community Resources: Other (None)        Nursing Interventions:  All discharge instructions reviewed with the patient. Reviewed when to call MD vs when to call 911 or go the ED. Educated patient on the importance of taking all meds as prescribed as well as close f/u with PCP/specialists. Pt verbalized understanding and will contact the office with any further questions or concerns. Patient denies fevers, chills, nausea, vomiting, shortness of breath, chest pain, or any other symptoms at this time.  Patient verbalized understanding and agreeable.           Medications:Reviewed medication list.  Medications are up to date.  Current Outpatient Medications   Medication Sig Dispense Refill    ibuprofen 600 MG Oral Tab Take 1 tablet (600 mg total) by mouth every 8 (eight) hours as needed for Pain or Fever. 30 tablet 0    HYDROcodone-acetaminophen 5-325 MG Oral Tab Take 1 tablet by mouth every 6 (six) hours as needed for Pain. 16 tablet 0    hydroCHLOROthiazide 25 MG Oral Tab Take 1 tablet (25 mg total) by mouth daily. 90 tablet 3    simvastatin 20 MG Oral Tab Take 1 tablet (20 mg total) by mouth daily. 90 tablet 3    Linolenic Acid (OMEGA 3 OR) Take by mouth.      Multiple Vitamins-Minerals (MULTI-VITAMIN/MINERALS) Oral Tab Take 1 tablet by mouth daily. Focus factor      aspirin 81 MG Oral Tab Take 1 tablet (81 mg total) by mouth daily.             Follow-up Appointments:  Your appointments       Date & Time Appointment  Department ([place])    Aug 27, 2024 11:15 AM CDT Office Visit with Weiler, Colleen M, DO [facility] ([facility] [place]note 2[place]    Please arrive 15 minutes prior to your scheduled appointment. Please also bring your Insurance card, Photo ID, and your medication bottles or a list of your current medication.    If your condition improves and this appointment is no longer needed, please contact your physician office to cancel.    Please verify with your primary care provider if your insurance requires a referral.              [address] [address]  [phone]            [facility]  Was TCC Ordered: No      Primary Care Provider (If no TCC appointment)  Does patient already have a PCP appointment scheduled? Yes  Nurse Care Manager Confirmed PCP office ER Follow-up appointment with patient       Specialist  Does the patient have any other follow-up appointment(s) need to be scheduled? No      []  Patient verbally agrees to additional follow-up calls from Nurse Care Manager  Pt declined follow up calls.     Book By Date: 9/1/24

## 2024-08-27 ENCOUNTER — LAB ENCOUNTER (OUTPATIENT)
Dept: LAB | Age: 69
End: 2024-08-27
Attending: FAMILY MEDICINE
Payer: MEDICARE

## 2024-08-27 ENCOUNTER — OFFICE VISIT (OUTPATIENT)
Dept: FAMILY MEDICINE CLINIC | Facility: CLINIC | Age: 69
End: 2024-08-27
Payer: MEDICARE

## 2024-08-27 VITALS
RESPIRATION RATE: 16 BRPM | DIASTOLIC BLOOD PRESSURE: 90 MMHG | TEMPERATURE: 98 F | SYSTOLIC BLOOD PRESSURE: 155 MMHG | BODY MASS INDEX: 33 KG/M2 | WEIGHT: 191 LBS | HEART RATE: 71 BPM

## 2024-08-27 DIAGNOSIS — R25.2 LEG CRAMPS: ICD-10-CM

## 2024-08-27 DIAGNOSIS — R10.9 ABDOMINAL CRAMPS: ICD-10-CM

## 2024-08-27 DIAGNOSIS — R10.9 ABDOMINAL CRAMPS: Primary | ICD-10-CM

## 2024-08-27 LAB
ANION GAP SERPL CALC-SCNC: 6 MMOL/L (ref 0–18)
BUN BLD-MCNC: 18 MG/DL (ref 9–23)
BUN/CREAT SERPL: 17.8 (ref 10–20)
CALCIUM BLD-MCNC: 9.6 MG/DL (ref 8.7–10.4)
CHLORIDE SERPL-SCNC: 112 MMOL/L (ref 98–112)
CO2 SERPL-SCNC: 26 MMOL/L (ref 21–32)
CREAT BLD-MCNC: 1.01 MG/DL
EGFRCR SERPLBLD CKD-EPI 2021: 80 ML/MIN/1.73M2 (ref 60–?)
FASTING STATUS PATIENT QL REPORTED: NO
GLUCOSE BLD-MCNC: 85 MG/DL (ref 70–99)
MAGNESIUM SERPL-MCNC: 1.9 MG/DL (ref 1.6–2.6)
OSMOLALITY SERPL CALC.SUM OF ELEC: 299 MOSM/KG (ref 275–295)
POTASSIUM SERPL-SCNC: 3.9 MMOL/L (ref 3.5–5.1)
SODIUM SERPL-SCNC: 144 MMOL/L (ref 136–145)

## 2024-08-27 PROCEDURE — 80048 BASIC METABOLIC PNL TOTAL CA: CPT

## 2024-08-27 PROCEDURE — 1159F MED LIST DOCD IN RCRD: CPT | Performed by: FAMILY MEDICINE

## 2024-08-27 PROCEDURE — 1160F RVW MEDS BY RX/DR IN RCRD: CPT | Performed by: FAMILY MEDICINE

## 2024-08-27 PROCEDURE — 3080F DIAST BP >= 90 MM HG: CPT | Performed by: FAMILY MEDICINE

## 2024-08-27 PROCEDURE — 3077F SYST BP >= 140 MM HG: CPT | Performed by: FAMILY MEDICINE

## 2024-08-27 PROCEDURE — 99213 OFFICE O/P EST LOW 20 MIN: CPT | Performed by: FAMILY MEDICINE

## 2024-08-27 PROCEDURE — 36415 COLL VENOUS BLD VENIPUNCTURE: CPT

## 2024-08-27 PROCEDURE — 83735 ASSAY OF MAGNESIUM: CPT

## 2024-08-27 NOTE — PROGRESS NOTES
HPI: Jose Rafael is a 69 year old male who presents for ER follow-up. Pt was in [place] and was getting cramps in calves and feet. Pt was swimming with dolphins the next day and got severe cramp in left flank and left lateral leg as he twisted to the left.  Felt like lightening. Took friend's pain pill.  On Saturday morning, he had severe pain in left flank again. Flew back home and went to [place] ER. Had xray and was told he had muscle spasms. Discharged with Ibuprofen and Norco. Only taking Ibuprofen.  Avoiding Norco. Feeling better.      PMH:    Past Medical History:    Colon polyp    Pathology came back negative    Colon polyps    polyps, colon    Cyst of neck    3/22/17 Right Neck Cyst    Essential hypertension    Taking Hydrochlorothiazide 12.5 mg    High blood pressure    High cholesterol    Hyperlipidemia    Torn rotator cuff    rotator cuff tear (right).  2011 Rotator cuff repair      Alg:  Patient has no known allergies.   Meds:   Current Outpatient Medications on File Prior to Visit   Medication Sig Dispense Refill    ibuprofen 600 MG Oral Tab Take 1 tablet (600 mg total) by mouth every 8 (eight) hours as needed for Pain or Fever. 30 tablet 0    hydroCHLOROthiazide 25 MG Oral Tab Take 1 tablet (25 mg total) by mouth daily. 90 tablet 3    simvastatin 20 MG Oral Tab Take 1 tablet (20 mg total) by mouth daily. 90 tablet 3    Linolenic Acid (OMEGA 3 OR) Take by mouth.      Multiple Vitamins-Minerals (MULTI-VITAMIN/MINERALS) Oral Tab Take 1 tablet by mouth daily. Focus factor      aspirin 81 MG Oral Tab Take 1 tablet (81 mg total) by mouth daily.      HYDROcodone-acetaminophen 5-325 MG Oral Tab Take 1 tablet by mouth every 6 (six) hours as needed for Pain. (Patient not taking: Reported on 8/27/2024) 16 tablet 0     No current facility-administered medications on file prior to visit.      Tobacco Use: no    Objective:   Gen: AOx3. NAD.  /90   Pulse 71   Temp 98 °F (36.7 °C) (Temporal)   Resp 16   Wt 191 lb  (86.6 kg)   BMI 32.79 kg/m²   Abd: Mild tenderness noted to left lower ribs    Assessment:/Plan:  Encounter Diagnoses   Name Primary?    Abdominal cramps Yes    Leg cramps        Improving. Will check BMP and magnesium level.  Discussed ways to combat cramping.  Advised regular stretching.     Colleen Weiler, DO

## 2024-09-03 ENCOUNTER — APPOINTMENT (OUTPATIENT)
Dept: GENERAL RADIOLOGY | Facility: HOSPITAL | Age: 69
End: 2024-09-03
Attending: EMERGENCY MEDICINE
Payer: MEDICARE

## 2024-09-03 ENCOUNTER — HOSPITAL ENCOUNTER (INPATIENT)
Facility: HOSPITAL | Age: 69
LOS: 3 days | Discharge: HOME | End: 2024-09-06
Attending: EMERGENCY MEDICINE | Admitting: STUDENT IN AN ORGANIZED HEALTH CARE EDUCATION/TRAINING PROGRAM
Payer: MEDICARE

## 2024-09-03 DIAGNOSIS — A41.9 SEPSIS, DUE TO UNSPECIFIED ORGANISM, UNSPECIFIED WHETHER ACUTE ORGAN DYSFUNCTION PRESENT (HCC): ICD-10-CM

## 2024-09-03 DIAGNOSIS — R50.9 FEVER, UNSPECIFIED FEVER CAUSE: ICD-10-CM

## 2024-09-03 DIAGNOSIS — J18.9 PNEUMONIA OF LEFT LOWER LOBE DUE TO INFECTIOUS ORGANISM: Primary | ICD-10-CM

## 2024-09-03 LAB
ALBUMIN SERPL-MCNC: 5.1 G/DL (ref 3.2–4.8)
ALBUMIN/GLOB SERPL: 1.5 {RATIO} (ref 1–2)
ALP LIVER SERPL-CCNC: 59 U/L
ALT SERPL-CCNC: 26 U/L
ANION GAP SERPL CALC-SCNC: 9 MMOL/L (ref 0–18)
AST SERPL-CCNC: 18 U/L (ref ?–34)
BASOPHILS # BLD AUTO: 0.06 X10(3) UL (ref 0–0.2)
BASOPHILS NFR BLD AUTO: 0.5 %
BILIRUB SERPL-MCNC: 0.7 MG/DL (ref 0.2–1.1)
BUN BLD-MCNC: 13 MG/DL (ref 9–23)
BUN/CREAT SERPL: 11.3 (ref 10–20)
CALCIUM BLD-MCNC: 9.9 MG/DL (ref 8.7–10.4)
CHLORIDE SERPL-SCNC: 101 MMOL/L (ref 98–112)
CO2 SERPL-SCNC: 29 MMOL/L (ref 21–32)
CREAT BLD-MCNC: 1.15 MG/DL
D DIMER PPP FEU-MCNC: 0.55 UG/ML FEU (ref ?–0.71)
DEPRECATED RDW RBC AUTO: 43.8 FL (ref 35.1–46.3)
EGFRCR SERPLBLD CKD-EPI 2021: 68 ML/MIN/1.73M2 (ref 60–?)
EOSINOPHIL # BLD AUTO: 0.04 X10(3) UL (ref 0–0.7)
EOSINOPHIL NFR BLD AUTO: 0.3 %
ERYTHROCYTE [DISTWIDTH] IN BLOOD BY AUTOMATED COUNT: 12.9 % (ref 11–15)
GLOBULIN PLAS-MCNC: 3.3 G/DL (ref 2–3.5)
GLUCOSE BLD-MCNC: 115 MG/DL (ref 70–99)
HCT VFR BLD AUTO: 47.9 %
HGB BLD-MCNC: 16.4 G/DL
IMM GRANULOCYTES # BLD AUTO: 0.12 X10(3) UL (ref 0–1)
IMM GRANULOCYTES NFR BLD: 0.9 %
LACTATE SERPL-SCNC: 1.5 MMOL/L (ref 0.5–2)
LYMPHOCYTES # BLD AUTO: 1.04 X10(3) UL (ref 1–4)
LYMPHOCYTES NFR BLD AUTO: 8 %
MCH RBC QN AUTO: 31.5 PG (ref 26–34)
MCHC RBC AUTO-ENTMCNC: 34.2 G/DL (ref 31–37)
MCV RBC AUTO: 92.1 FL
MONOCYTES # BLD AUTO: 1.82 X10(3) UL (ref 0.1–1)
MONOCYTES NFR BLD AUTO: 13.9 %
NEUTROPHILS # BLD AUTO: 9.98 X10 (3) UL (ref 1.5–7.7)
NEUTROPHILS # BLD AUTO: 9.98 X10(3) UL (ref 1.5–7.7)
NEUTROPHILS NFR BLD AUTO: 76.4 %
OSMOLALITY SERPL CALC.SUM OF ELEC: 289 MOSM/KG (ref 275–295)
PLATELET # BLD AUTO: 207 10(3)UL (ref 150–450)
POTASSIUM SERPL-SCNC: 4 MMOL/L (ref 3.5–5.1)
PROT SERPL-MCNC: 8.4 G/DL (ref 5.7–8.2)
RBC # BLD AUTO: 5.2 X10(6)UL
SARS-COV-2 RNA RESP QL NAA+PROBE: NOT DETECTED
SODIUM SERPL-SCNC: 139 MMOL/L (ref 136–145)
TROPONIN I SERPL HS-MCNC: 3 NG/L
WBC # BLD AUTO: 13.1 X10(3) UL (ref 4–11)

## 2024-09-03 PROCEDURE — 71045 X-RAY EXAM CHEST 1 VIEW: CPT | Performed by: EMERGENCY MEDICINE

## 2024-09-03 PROCEDURE — 99223 1ST HOSP IP/OBS HIGH 75: CPT | Performed by: INTERNAL MEDICINE

## 2024-09-03 RX ORDER — ALBUTEROL SULFATE 0.83 MG/ML
5 SOLUTION RESPIRATORY (INHALATION) ONCE
Status: COMPLETED | OUTPATIENT
Start: 2024-09-03 | End: 2024-09-03

## 2024-09-03 RX ORDER — SODIUM CHLORIDE 9 MG/ML
INJECTION, SOLUTION INTRAVENOUS CONTINUOUS
Status: DISCONTINUED | OUTPATIENT
Start: 2024-09-03 | End: 2024-09-06

## 2024-09-03 RX ORDER — ATORVASTATIN CALCIUM 10 MG/1
10 TABLET, FILM COATED ORAL NIGHTLY
Status: DISCONTINUED | OUTPATIENT
Start: 2024-09-03 | End: 2024-09-06

## 2024-09-03 RX ORDER — AZITHROMYCIN 250 MG/1
500 TABLET, FILM COATED ORAL ONCE
Status: COMPLETED | OUTPATIENT
Start: 2024-09-03 | End: 2024-09-03

## 2024-09-03 RX ORDER — ACETAMINOPHEN 500 MG
1000 TABLET ORAL ONCE
Status: COMPLETED | OUTPATIENT
Start: 2024-09-03 | End: 2024-09-03

## 2024-09-03 RX ORDER — HEPARIN SODIUM 5000 [USP'U]/ML
5000 INJECTION, SOLUTION INTRAVENOUS; SUBCUTANEOUS EVERY 12 HOURS SCHEDULED
Status: DISCONTINUED | OUTPATIENT
Start: 2024-09-03 | End: 2024-09-06

## 2024-09-03 RX ORDER — ACETAMINOPHEN 500 MG
500 TABLET ORAL EVERY 4 HOURS PRN
Status: DISCONTINUED | OUTPATIENT
Start: 2024-09-03 | End: 2024-09-06

## 2024-09-03 NOTE — ED INITIAL ASSESSMENT (HPI)
Patient c/o chest congestion with cough and headache since Sunday night. Denies sick contacts. +Body aches and chills, unsure of fevers.

## 2024-09-04 LAB
ALBUMIN SERPL-MCNC: 4.3 G/DL (ref 3.2–4.8)
ALBUMIN/GLOB SERPL: 1.5 {RATIO} (ref 1–2)
ALP LIVER SERPL-CCNC: 47 U/L
ALT SERPL-CCNC: 20 U/L
ANION GAP SERPL CALC-SCNC: 7 MMOL/L (ref 0–18)
AST SERPL-CCNC: 14 U/L (ref ?–34)
ATRIAL RATE: 94 BPM
BASOPHILS # BLD AUTO: 0.05 X10(3) UL (ref 0–0.2)
BASOPHILS NFR BLD AUTO: 0.4 %
BILIRUB SERPL-MCNC: 1 MG/DL (ref 0.2–1.1)
BUN BLD-MCNC: 12 MG/DL (ref 9–23)
BUN/CREAT SERPL: 10.3 (ref 10–20)
C DIFF TOX B STL QL: NEGATIVE
CALCIUM BLD-MCNC: 9 MG/DL (ref 8.7–10.4)
CHLORIDE SERPL-SCNC: 102 MMOL/L (ref 98–112)
CO2 SERPL-SCNC: 26 MMOL/L (ref 21–32)
CREAT BLD-MCNC: 1.16 MG/DL
DEPRECATED RDW RBC AUTO: 42.7 FL (ref 35.1–46.3)
EGFRCR SERPLBLD CKD-EPI 2021: 67 ML/MIN/1.73M2 (ref 60–?)
EOSINOPHIL # BLD AUTO: 0.05 X10(3) UL (ref 0–0.7)
EOSINOPHIL NFR BLD AUTO: 0.4 %
ERYTHROCYTE [DISTWIDTH] IN BLOOD BY AUTOMATED COUNT: 12.9 % (ref 11–15)
GLOBULIN PLAS-MCNC: 2.9 G/DL (ref 2–3.5)
GLUCOSE BLD-MCNC: 131 MG/DL (ref 70–99)
HCT VFR BLD AUTO: 42.5 %
HGB BLD-MCNC: 15.1 G/DL
IMM GRANULOCYTES # BLD AUTO: 0.1 X10(3) UL (ref 0–1)
IMM GRANULOCYTES NFR BLD: 0.8 %
LYMPHOCYTES # BLD AUTO: 0.98 X10(3) UL (ref 1–4)
LYMPHOCYTES NFR BLD AUTO: 7.6 %
MCH RBC QN AUTO: 31.7 PG (ref 26–34)
MCHC RBC AUTO-ENTMCNC: 35.5 G/DL (ref 31–37)
MCV RBC AUTO: 89.1 FL
MONOCYTES # BLD AUTO: 1.52 X10(3) UL (ref 0.1–1)
MONOCYTES NFR BLD AUTO: 11.8 %
NEUTROPHILS # BLD AUTO: 10.16 X10 (3) UL (ref 1.5–7.7)
NEUTROPHILS # BLD AUTO: 10.16 X10(3) UL (ref 1.5–7.7)
NEUTROPHILS NFR BLD AUTO: 79 %
OSMOLALITY SERPL CALC.SUM OF ELEC: 282 MOSM/KG (ref 275–295)
P AXIS: 53 DEGREES
P-R INTERVAL: 154 MS
PLATELET # BLD AUTO: 177 10(3)UL (ref 150–450)
POTASSIUM SERPL-SCNC: 4.1 MMOL/L (ref 3.5–5.1)
PROT SERPL-MCNC: 7.2 G/DL (ref 5.7–8.2)
Q-T INTERVAL: 344 MS
QRS DURATION: 74 MS
QTC CALCULATION (BEZET): 430 MS
R AXIS: 24 DEGREES
RBC # BLD AUTO: 4.77 X10(6)UL
SODIUM SERPL-SCNC: 135 MMOL/L (ref 136–145)
T AXIS: 19 DEGREES
VENTRICULAR RATE: 94 BPM
WBC # BLD AUTO: 12.9 X10(3) UL (ref 4–11)

## 2024-09-04 PROCEDURE — 99233 SBSQ HOSP IP/OBS HIGH 50: CPT | Performed by: INTERNAL MEDICINE

## 2024-09-04 PROCEDURE — 99223 1ST HOSP IP/OBS HIGH 75: CPT | Performed by: INTERNAL MEDICINE

## 2024-09-04 RX ORDER — AZITHROMYCIN 250 MG/1
500 TABLET, FILM COATED ORAL
Status: DISCONTINUED | OUTPATIENT
Start: 2024-09-05 | End: 2024-09-06

## 2024-09-04 RX ORDER — BENZONATATE 200 MG/1
200 CAPSULE ORAL 3 TIMES DAILY PRN
Status: DISCONTINUED | OUTPATIENT
Start: 2024-09-04 | End: 2024-09-06

## 2024-09-04 RX ORDER — AZITHROMYCIN 250 MG/1
250 TABLET, FILM COATED ORAL DAILY
Status: DISCONTINUED | OUTPATIENT
Start: 2024-09-04 | End: 2024-09-04 | Stop reason: ALTCHOICE

## 2024-09-04 RX ORDER — AZITHROMYCIN 250 MG/1
250 TABLET, FILM COATED ORAL ONCE
Status: COMPLETED | OUTPATIENT
Start: 2024-09-04 | End: 2024-09-04

## 2024-09-04 NOTE — PROGRESS NOTES
[facility]  part of [facility]     Hospitalist Progress Note     Jose Rafael Oscar Patient Status:  Inpatient    1955 MRN [id number]   Location [facility]5W Attending Dawit Anthony MD   Hosp Day # 1 PCP Colleen Weiler, DO     Chief Complaint:   Chief Complaint   Patient presents with    Cough/URI        Subjective:     Patient seen sitting in bed.  Wife at bedside.  Patient denies acute events overnight.  Patient continues to have cough, nonproductive.  Denies current subjective shortness of breath.  Patient does feel feverish.    Objective:      Vital signs:  Vitals:    24 0007 24 0008 24 0539 24 0814   BP:   155/87 128/67   BP Location:   Right arm Right arm   Pulse: 88  91 83   Resp:   16 18   Temp:   (!) 102.9 °F (39.4 °C) 99.5 °F (37.5 °C)   TempSrc:   Oral Oral   SpO2:   96% 94%   Weight:  182 lb 9.6 oz (82.8 kg)     Height:           Intake/Output Summary (Last 24 hours) at 2024 1140  Last data filed at 2024 0800  Gross per 24 hour   Intake 1986.67 ml   Output 450 ml   Net 1536.67 ml           Physical Exam:    GENERAL:  Awake and alert, in no acute distress.  HEART:  Regular rhythm, regular rate  LUNGS:  Air entry was decreased, worse over left base.  No increased work of breathing or wheezes   ABDOMEN: Soft and non-tender.    PSYCHIATRIC: Normal mood    Diagnostic Data:    Labs:    Recent Labs   Lab 24  0537   WBC 13.1* 12.9*   HGB 16.4 15.1   MCV 92.1 89.1   .0 177.0       Recent Labs   Lab 24  0537   * 131*   BUN 13 12   CREATSERUM 1.15 1.16   CA 9.9 9.0   ALB 5.1* 4.3    135*   K 4.0 4.1    102   CO2 29.0 26.0   ALKPHO 59 47   AST 18 14   ALT 26 20   BILT 0.7 1.0   TP 8.4* 7.2           Estimated Creatinine Clearance: 48.9 mL/min (based on SCr of 1.16 mg/dL).    No results for input(s): \"PTP\", \"INR\" in the last 168 hours.         COVID-19  Lab Results    Component Value Date    COVID19 Not Detected 09/03/2024    COVID19 Detected (A) 12/02/2023    COVID19 Detected (A) 07/21/2022       Pro-Calcitonin  No results for input(s): \"PCT\" in the last 168 hours.    Cardiac  No results for input(s): \"TROP\", \"PBNP\" in the last 168 hours.    Inflammatory Markers  Recent Labs   Lab 09/03/24 1959   DDIMER 0.55       Culture:  No results found for this visit on 09/03/24.    XR CHEST AP PORTABLE  (CPT=71045)    Result Date: 9/3/2024  CONCLUSION:  1. Left lower lobe pneumonia and or atelectasis. 2. Probable subsegmental atelectasis in right lower lobe.     Dictated by (CST): Juanpablo Hernandez MD on 9/03/2024 at 8:35 PM     Finalized by (CST): Juanpablo Hernandez MD on 9/03/2024 at 8:37 PM           EKG 12 Lead    Result Date: 9/4/2024  Normal sinus rhythm Inferior infarct , age undetermined Abnormal ECG No previous ECGs found in [facility]     Medications:    amoxicillin potassium and clavulanate  875 mg Oral Q12H    azithromycin  250 mg Oral Daily    heparin  5,000 Units Subcutaneous 2 times per day    atorvastatin  10 mg Oral Nightly       Assessment & Plan:       Pneumonia of left lower lobe due to infectious organism    Sepsis, due to unspecified organism, unspecified whether acute organ dysfunction present (HCC)  -   Sepsis protocol ordered.  -Continue 0.9 normal saline  -Initially started on Augmentin and azithromycin.  -Patient with persistent fevers this a.m.  Change Augmentin to IV ceftriaxone.  -Pulmonology on consult, appreciate further recommendations     Hypertension  -Patient currently normotensive  -Holding antihypertensive agents at this time  - Monitor and add agents as needed    HLD  -Continue statin        Plan of care discussed with patient and family at bedside . Discussed management/test result(s) with Rn and Pulmonology consultant    Quality:  DVT Prophylaxis: Heparin  CODE status: Full  Estimated date of discharge: TBD  Discharge is dependent on: clinical  stability    Dawit Anthony MD          This note was prepared using Dragon Medical voice recognition dictation software. As a result errors may occur. When identified these errors have been corrected. While every attempt is made to correct errors during dictation discrepancies may still exist

## 2024-09-04 NOTE — PLAN OF CARE
Pt is aox4, resting in bed, weaned off o2. Ambulating in room, po abx  Problem: Patient Centered Care  Goal: Patient preferences are identified and integrated in the patient's plan of care  Description: Interventions:  - What would you like us to know as we care for you?   - Provide timely, complete, and accurate information to patient/family  - Incorporate patient and family knowledge, values, beliefs, and cultural backgrounds into the planning and delivery of care  - Encourage patient/family to participate in care and decision-making at the level they choose  - Honor patient and family perspectives and choices  Outcome: Progressing     Problem: Patient/Family Goals  Goal: Patient/Family Long Term Goal  Description: Patient's Long Term Goal: to feel better    Interventions:  - Po abx, wean o2.   - See additional Care Plan goals for specific interventions  Outcome: Progressing  Goal: Patient/Family Short Term Goal  Description: Patient's Short Term Goal: to go home    Interventions:   - dc planning  - See additional Care Plan goals for specific interventions  Outcome: Progressing     Problem: PAIN - ADULT  Goal: Verbalizes/displays adequate comfort level or patient's stated pain goal  Description: INTERVENTIONS:  - Encourage pt to monitor pain and request assistance  - Assess pain using appropriate pain scale  - Administer analgesics based on type and severity of pain and evaluate response  - Implement non-pharmacological measures as appropriate and evaluate response  - Consider cultural and social influences on pain and pain management  - Manage/alleviate anxiety  - Utilize distraction and/or relaxation techniques  - Monitor for opioid side effects  - Notify MD/LIP if interventions unsuccessful or patient reports new pain  - Anticipate increased pain with activity and pre-medicate as appropriate  Outcome: Progressing     Problem: RISK FOR INFECTION - ADULT  Goal: Absence of fever/infection during anticipated  neutropenic period  Description: INTERVENTIONS  - Monitor WBC  - Administer growth factors as ordered  - Implement neutropenic guidelines  Outcome: Progressing     Problem: DISCHARGE PLANNING  Goal: Discharge to home or other facility with appropriate resources  Description: INTERVENTIONS:  - Identify barriers to discharge w/pt and caregiver  - Include patient/family/discharge partner in discharge planning  - Arrange for needed discharge resources and transportation as appropriate  - Identify discharge learning needs (meds, wound care, etc)  - Arrange for interpreters to assist at discharge as needed  - Consider post-discharge preferences of patient/family/discharge partner  - Complete POLST form as appropriate  - Assess patient's ability to be responsible for managing their own health  - Refer to Case Management Department for coordinating discharge planning if the patient needs post-hospital services based on physician/LIP order or complex needs related to functional status, cognitive ability or social support system  Outcome: Progressing     Problem: RESPIRATORY - ADULT  Goal: Achieves optimal ventilation and oxygenation  Description: INTERVENTIONS:  - Assess for changes in respiratory status  - Assess for changes in mentation and behavior  - Position to facilitate oxygenation and minimize respiratory effort  - Oxygen supplementation based on oxygen saturation or ABGs  - Provide Smoking Cessation handout, if applicable  - Encourage broncho-pulmonary hygiene including cough, deep breathe, Incentive Spirometry  - Assess the need for suctioning and perform as needed  - Assess and instruct to report SOB or any respiratory difficulty  - Respiratory Therapy support as indicated  - Manage/alleviate anxiety  - Monitor for signs/symptoms of CO2 retention  Outcome: Progressing

## 2024-09-04 NOTE — ED PROVIDER NOTES
[facility] Emergency Department Note  Patient: Jose Rafael Oscar Age: 69 year old Sex: male      MRN: [id number]  : 1955    Patient Seen in: [facility] Emergency Department    History     Chief Complaint   Patient presents with    Cough/URI     Stated Complaint: chest pain, cough, headache    History obtained from: patient     69M hx of HTN and HLD presents to the ED with complaints of cough, fever, fatigue.  Patient states that for the past 2 days he has been feeling unwell, reports chest congestion and tightness worse with coughing.  States when he coughs he will have a brief frontal headache that then resolves when he is not coughing.  He denies any constant or thunderclap headache.  He denies numbness or tingling in his extremities.  Does report subjective fever and chills and bodyaches.  He reports feeling slightly short of breath and has been coughing.  Cough is mostly dry though occasionally productive of sputum though no hemoptysis.  He denies history of DVT or PE.  Currently traveled from [place] last week.  Denies history of cancer.     Review of Systems:  Review of Systems  Positive for stated complaint: chest pain, cough, headache. Constitutional and vital signs reviewed. All other systems reviewed and negative except as noted above.    Patient History:  Past Medical History:    Colon polyp    Pathology came back negative    Colon polyps    polyps, colon    Cyst of neck    3/22/17 Right Neck Cyst    Essential hypertension    Taking Hydrochlorothiazide 12.5 mg    High blood pressure    High cholesterol    Hyperlipidemia    Torn rotator cuff    rotator cuff tear (right).   Rotator cuff repair       Past Surgical History:   Procedure Laterality Date    Colonoscopy   then every 5 years    Colonoscopy N/A 2019    Procedure: COLONOSCOPY;  Surgeon: Lui Humphrey MD;  Location: [facility] ENDOSCOPY    Colonoscopy      Colonoscopy N/A 1/3/2020    Procedure: COLONOSCOPY;  Surgeon:  Lui Humphrey MD;  Location: [facility] ENDOSCOPY    Exc skin benig 2.1-3cm remaindr body Right 3/22/17    Repair rotator cuff,chronic Right 2011    rotator cuff repair.   rotator cuff tear (right).  Lawrence Heredia    Repr cmpl wnd head,fac,hand 2.6-7.5 Right 3/22/17    Tonsillectomy  1971        Family History   Problem Relation Age of Onset    Other (brain aneurysm) Father 47        47 onset (cause of death)    Colon Cancer Cousin 61        onset 61 (cause of death)    Heart Disease Mother 65        CAD, CABG x4, onset @ 65    Rash Sister         scleroderma    Lipids Other         hyperlipidemia, family h/o    Cancer Paternal Grandmother         She  from it       Specific Social Determinants of Health:   Social History     Socioeconomic History    Marital status:    Tobacco Use    Smoking status: Never     Passive exposure: Never    Smokeless tobacco: Never   Vaping Use    Vaping status: Never Used   Substance and Sexual Activity    Alcohol use: Yes     Comment: Socially    Drug use: No   Other Topics Concern    Caffeine Concern Yes     Comment: soda     Social Determinants of Health     Financial Resource Strain: Low Risk  (2024)    Financial Resource Strain     Difficulty of Paying Living Expenses: Not hard at all   Food Insecurity: No Food Insecurity (2024)    Food Insecurity     Food Insecurity: Never true   Transportation Needs: No Transportation Needs (2024)    Transportation Needs     Lack of Transportation: No   Housing Stability: Low Risk  (2024)    Housing Stability     Housing Instability: No           PSFH elements reviewed from today and agreed except as otherwise stated in HPI.    Physical Exam     ED Triage Vitals [24 1858]   /85   Pulse 97   Resp 24   Temp 98.9 °F (37.2 °C)   Temp src Temporal   SpO2 98 %   O2 Device None (Room air)       Current:/83 (BP Location: Right arm)   Pulse 95   Temp 98 °F (36.7 °C) (Oral)   Resp 16   Ht 162.6 cm (5' 4\")    Wt 82.8 kg   SpO2 95%   BMI 31.34 kg/m²         Physical Exam  Vitals and nursing note reviewed.   Constitutional:       General: He is not in acute distress.     Appearance: He is not ill-appearing.   HENT:      Head: Normocephalic and atraumatic.      Nose: Nose normal.      Mouth/Throat:      Mouth: Mucous membranes are moist.      Pharynx: Oropharynx is clear.   Eyes:      Conjunctiva/sclera: Conjunctivae normal.   Cardiovascular:      Rate and Rhythm: Regular rhythm. Tachycardia present.      Heart sounds: No murmur heard.     Comments: 2+ radial and dp pulses bilaterally   Pulmonary:      Breath sounds: No stridor.      Comments: Pt tachypneic, coarse crackles at L lung base.   Abdominal:      General: There is no distension.      Palpations: Abdomen is soft.      Tenderness: There is no abdominal tenderness. There is no guarding or rebound.   Musculoskeletal:      Right lower leg: No edema.      Left lower leg: No edema.   Skin:     General: Skin is warm and dry.      Capillary Refill: Capillary refill takes less than 2 seconds.   Neurological:      General: No focal deficit present.      Mental Status: He is alert.         ED Course   Labs:   Labs Reviewed   CBC WITH DIFFERENTIAL WITH PLATELET - Abnormal; Notable for the following components:       Result Value    WBC 13.1 (*)     Neutrophil Absolute Prelim 9.98 (*)     Neutrophil Absolute 9.98 (*)     Monocyte Absolute 1.82 (*)     All other components within normal limits   COMP METABOLIC PANEL (14) - Abnormal; Notable for the following components:    Glucose 115 (*)     Total Protein 8.4 (*)     Albumin 5.1 (*)     All other components within normal limits   TROPONIN I HIGH SENSITIVITY - Normal   LACTIC ACID, PLASMA - Normal   D-DIMER - Normal   RAPID SARS-COV-2 BY PCR - Normal   COMP METABOLIC PANEL (14)   CBC WITH DIFFERENTIAL WITH PLATELET   RAINBOW DRAW BLUE   BLOOD CULTURE   BLOOD CULTURE     Radiology findings:  I personally reviewed chest x-ray  on my interpretation there appears to be possible multifocal pneumonia, no consolidation or pneumothorax      XR CHEST AP PORTABLE  (CPT=71045)    Result Date: 9/3/2024  CONCLUSION:  1. Left lower lobe pneumonia and or atelectasis. 2. Probable subsegmental atelectasis in right lower lobe.     Dictated by (CST): Juanpablo Hernandez MD on 9/03/2024 at 8:35 PM     Finalized by (CST): Juanpablo Hernandez MD on 9/03/2024 at 8:37 PM           EKG as interpreted by me: Ecg my interpretation shows nsr rate 96 bpm, normal axis, normal intervals, qtc 430 ms, Q waves in lead III and AVF, no stemi   Cardiac Monitor: Interpreted by me.   Pulse Readings from Last 1 Encounters:   09/03/24 95   , sinus,       MDM   69M hx of HTN and HLD presents to the ED with complaints of cough, fever, fatigue. Pt tachypneic and febrile on my assessment RR of 26-30.     Differential diagnoses considered includes, but is not limited to:   Viral syndrome  Pneumonia  Myocarditis  Pericarditis   PE - cannot exclude given age and tachycardia     Will obtain the following tests: cbc, cmp, trop, lactic, dimer, cov, cxr, ecg  Will administer the following medications/therapies: 5mg albuterol neb, IV NS bolus, tylenol     Please see ED course for my independent review of these tests/imaging results.    Chronic conditions affecting care: HTN and HLD     ED Course as of 09/04/24 0130  ------------------------------------------------------------  Time: 09/03 1946  Value: Rapid SARS-CoV-2 by PCR: Not Detected  Comment: (Reviewed)  ------------------------------------------------------------  Time: 09/03 2001  Comment: Ecg my interpretation shows nsr rate 96 bpm, normal axis, normal intervals, qtc 430 ms, Q waves in lead III and AVF, no stemi   ------------------------------------------------------------  Time: 09/03 2004  Value: Temp(!): 101.2 °F (38.4 °C)  Comment: (Reviewed)  ------------------------------------------------------------  Time: 09/03 2005  Value: Rapid  SARS-CoV-2 by PCR: Not Detected  Comment: (Reviewed)  ------------------------------------------------------------  Time: 09/03 2027  Value: WBC(!): 13.1  Comment: (Reviewed)  ------------------------------------------------------------  Time: 09/03 2037  Value: XR CHEST AP PORTABLE  (CPT=71045)  Comment: Cxr looks c/w pneumonia on left side, no pneumothorax or consolidation   ------------------------------------------------------------  Time: 09/03 2038  Value: XR CHEST AP PORTABLE  (CPT=71045)  Comment: LLL pneumonia.  Patient reassessed.  Updated with results.  He still quite tachypneic, satting about 94% on room air otherwise placed on 2 L nasal cannula for comfort.  States he feels a little bit better after breathing treatment.  Given his leukocytosis, tachypnea, age greater than 65 by curb 65 he is moderate risk for worsening outcomes with his pneumonia.  After shared decision making we will keep for admission for antibiotics.  Blood cultures ordered.  His lactic acid is normal. Case d/w Dr. Kumar she accepts for observation comfortable with the plan.   ------------------------------------------------------------  Time: 09/03 2040  Comment: Cmp unremarkable   ------------------------------------------------------------  Time: 09/03 2202  Value: D-Dimer: 0.55  Comment: Dimer WNL.             Procedures:  Procedures      CURB-65: +1 age, +1 tachypnea RR >30  Moderate risk group with 6.8% 30 day mortality     Disposition and Plan     Clinical Impression:  1. Pneumonia of left lower lobe due to infectious organism    2. Fever, unspecified fever cause    3. Sepsis, due to unspecified organism, unspecified whether acute organ dysfunction present (HCC)        Disposition:  Admit    Hospital Problems       Present on Admission  Date Reviewed: 8/27/2024            ICD-10-CM Noted POA    * (Principal) Pneumonia of left lower lobe due to infectious organism J18.9 9/3/2024 Unknown    Fever, unspecified fever cause  R50.9 9/3/2024 Unknown    Sepsis, due to unspecified organism, unspecified whether acute organ dysfunction present (HCC) A41.9 9/3/2024 Unknown        This note may have been created using voice dictation technology and may include inadvertent errors.      Carmen Childress DO  Attending Physician   Emergency Medicine

## 2024-09-04 NOTE — CONSULTS
[facility]  part of [facility]    Report of Consultation    Jose Rafael Oscar Patient Status:  Inpatient    1955 MRN [id number]   Location [facility]5W Attending Sandra Granda DO   Hosp Day # 1 PCP Colleen Weiler, DO     Date of Admission:  9/3/2024    Reason for Consultation:   Pneumonia    History of Present Illness:   Patient is a 69-year-old male with past medical history significant for hypertension who presents with chief complaint of cough and some chest congestion.  Recently returned from travel from [place] 1 week prior.  Evidence of fevers with Tmax 102.9 degrees noted during hospital course.  Denies history of known lung disease.  Denies significant wheezing.  Some occasional cough present.  Chest x-ray concerning for left lower lobe pneumonia.  Saturation stable on room air.    Past Medical History  Past Medical History:    Colon polyp    Pathology came back negative    Colon polyps    polyps, colon    Cyst of neck    3/22/17 Right Neck Cyst    Essential hypertension    Taking Hydrochlorothiazide 12.5 mg    High blood pressure    High cholesterol    Hyperlipidemia    Torn rotator cuff    rotator cuff tear (right).   Rotator cuff repair       Past Surgical History  Past Surgical History:   Procedure Laterality Date    Colonoscopy   then every 5 years    Colonoscopy N/A 2019    Procedure: COLONOSCOPY;  Surgeon: Lui Humphrey MD;  Location: [facility] ENDOSCOPY    Colonoscopy      Colonoscopy N/A 1/3/2020    Procedure: COLONOSCOPY;  Surgeon: Lui Humphrey MD;  Location: [facility] ENDOSCOPY    Exc skin benig 2.1-3cm remaindr body Right 3/22/17    Repair rotator cuff,chronic Right     rotator cuff repair.   rotator cuff tear (right).  Lawrence Heredia    Repr cmpl wnd head,fac,hand 2.6-7.5 Right 3/22/17    Tonsillectomy  1971       Family History  Family History   Problem Relation Age of Onset    Other (brain aneurysm) Father 47        47 onset (cause of  death)    Colon Cancer Cousin 61        onset 61 (cause of death)    Heart Disease Mother 65        CAD, CABG x4, onset @ 65    Rash Sister         scleroderma    Lipids Other         hyperlipidemia, family h/o    Cancer Paternal Grandmother         She  from it       Social History  Social History     Socioeconomic History    Marital status:    Tobacco Use    Smoking status: Never     Passive exposure: Never    Smokeless tobacco: Never   Vaping Use    Vaping status: Never Used   Substance and Sexual Activity    Alcohol use: Yes     Comment: Socially    Drug use: No   Other Topics Concern    Caffeine Concern Yes     Comment: soda           Current Medications:  Current Facility-Administered Medications   Medication Dose Route Frequency    amoxicillin clavulanate (Augmentin) 875-125 MG per tab 875 mg  875 mg Oral Q12H    azithromycin (Zithromax) tab 250 mg  250 mg Oral Daily    sodium chloride 0.9% infusion   Intravenous Continuous    heparin (Porcine) 5000 UNIT/ML injection 5,000 Units  5,000 Units Subcutaneous 2 times per day    acetaminophen (Tylenol Extra Strength) tab 500 mg  500 mg Oral Q4H PRN    atorvastatin (Lipitor) tab 10 mg  10 mg Oral Nightly     Medications Prior to Admission   Medication Sig    hydroCHLOROthiazide 25 MG Oral Tab Take 1 tablet (25 mg total) by mouth daily.    simvastatin 20 MG Oral Tab Take 1 tablet (20 mg total) by mouth daily.    Linolenic Acid (OMEGA 3 OR) Take by mouth.    Multiple Vitamins-Minerals (MULTI-VITAMIN/MINERALS) Oral Tab Take 1 tablet by mouth daily. Focus factor    aspirin 81 MG Oral Tab Take 1 tablet (81 mg total) by mouth daily.    [] ibuprofen 600 MG Oral Tab Take 1 tablet (600 mg total) by mouth every 8 (eight) hours as needed for Pain or Fever.    [] HYDROcodone-acetaminophen 5-325 MG Oral Tab Take 1 tablet by mouth every 6 (six) hours as needed for Pain. (Patient not taking: Reported on 2024)       Allergies  No Known  Allergies    Review of Systems:   Constitutional: Fevers, fatigue  HEENT: denies headache, sore throat, vision loss  Cardio: denies chest pain, chest pressure, palpitations  Respiratory: dyspnea, cough, denies wheezing, hemoptysis   GI: denies nausea, vomiting, abdominal pain  : denies dysuria, hematuria  Musculoskeletal: denies arthralgia, myalgia  Integumentary: denies rash, itching  Neurological: denies syncope, weakness, dizziness,   Psychiatric: denies depression, anxiety  Hematologic: denies bruising        Physical Exam:   Blood pressure 128/67, pulse 83, temperature 99.5 °F (37.5 °C), temperature source Oral, resp. rate 18, height 5' 4\" (1.626 m), weight 182 lb 9.6 oz (82.8 kg), SpO2 94%.    Constitutional: no acute distress  Eyes: PERRL  ENT: nares patent  Neck: neck supple, no JVD  Cardio: RRR, S1 S2  Respiratory: clear to auscultation bilaterally, no wheezing, rales, rhonchi, crackles  GI: abdomen soft, non tender, active bowel souds, no organomegaly  Extremities: no clubbing, cyanosis, edema  Neurologic: no gross motor deficits  Skin: warm, dry    Results:   Laboratory Data  Lab Results   Component Value Date    WBC 12.9 (H) 09/04/2024    HGB 15.1 09/04/2024    HCT 42.5 09/04/2024    .0 09/04/2024    CREATSERUM 1.16 09/04/2024    BUN 12 09/04/2024     (L) 09/04/2024    K 4.1 09/04/2024     09/04/2024    CO2 26.0 09/04/2024     (H) 09/04/2024    CA 9.0 09/04/2024    ALB 4.3 09/04/2024    ALKPHO 47 09/04/2024    TP 7.2 09/04/2024    AST 14 09/04/2024    ALT 20 09/04/2024    TSH 2.764 04/16/2024    PSA 0.5 08/28/2018    DDIMER 0.55 09/03/2024    MG 1.9 08/27/2024    B12 245 09/18/2018         Imaging  XR CHEST AP PORTABLE  (CPT=71045)    Result Date: 9/3/2024  CONCLUSION:  1. Left lower lobe pneumonia and or atelectasis. 2. Probable subsegmental atelectasis in right lower lobe.     Dictated by (CST): Juanpablo Hernandez MD on 9/03/2024 at 8:35 PM     Finalized by (CST): Juanpablo Hernandez,  MD on 9/03/2024 at 8:37 PM           Assessment   1.  Left lower lobe pneumonia  2.  Fevers  3.  Hypertension  4.  Leukocytosis    Plan   -Patient presents with evidence of some cough fevers after returning from travel from [place] 1 week prior.  -Chest x-ray on presentation with left lower lobe infiltrate seen concerning for pneumonia  -Continue empiric antibiotic therapy at this time  -Blood cultures pending  -IV hydration  -Follow fever curve  -Weaned off oxygen at this time  -Reviewed vitals, labs and imaging    Halie Dykes DO  Pulmonary Critical Care Medicine  [facility]  9/4/2024  10:11 AM

## 2024-09-04 NOTE — ED QUICK NOTES
Orders for admission, patient is aware of plan and ready to go upstairs. Any questions, please call ED RN Makayla at extension [phone].     Patient Covid vaccination status: Fully vaccinated     COVID Test Ordered in ED: Rapid SARS-CoV-2 by PCR    COVID Suspicion at Admission: N/A    Running Infusions:      Mental Status/LOC at time of transport: A/Ox4    Other pertinent information:   CIWA score: N/A   NIH score:  N/A

## 2024-09-04 NOTE — PLAN OF CARE
Problem: Patient Centered Care  Goal: Patient preferences are identified and integrated in the patient's plan of care  Description: Interventions:  - What would you like us to know as we care for you? From home with wife   - Provide timely, complete, and accurate information to patient/family  - Incorporate patient and family knowledge, values, beliefs, and cultural backgrounds into the planning and delivery of care  - Encourage patient/family to participate in care and decision-making at the level they choose  - Honor patient and family perspectives and choices  Outcome: Progressing     Problem: Patient/Family Goals  Goal: Patient/Family Long Term Goal  Description: Patient's Long Term Goal: To discharge from hospital     Interventions:  -  Monitor vital signs   - Monitor appropriate labs   - Pain management   - Administer medications per order   - Follow MD orders   - Diagnostics per order   - Update/inform patient and family on plan of care   - Discharge planning    - See additional Care Plan goals for specific interventions  Outcome: Progressing  Goal: Patient/Family Short Term Goal  Description: Patient's Short Term Goal: To improve congestion      Interventions:   - Monitor vital signs   - Monitor appropriate labs   - Pain management   - Administer medications per order   - Follow MD orders   - Diagnostics per order   - Update/inform patient and family on plan of care   - See additional Care Plan goals for specific interventions  Outcome: Progressing     Problem: PAIN - ADULT  Goal: Verbalizes/displays adequate comfort level or patient's stated pain goal  Description: INTERVENTIONS:  - Encourage pt to monitor pain and request assistance  - Assess pain using appropriate pain scale  - Administer analgesics based on type and severity of pain and evaluate response  - Implement non-pharmacological measures as appropriate and evaluate response  - Consider cultural and social influences on pain and pain  management  - Manage/alleviate anxiety  - Utilize distraction and/or relaxation techniques  - Monitor for opioid side effects  - Notify MD/LIP if interventions unsuccessful or patient reports new pain  - Anticipate increased pain with activity and pre-medicate as appropriate  Outcome: Progressing     Problem: RISK FOR INFECTION - ADULT  Goal: Absence of fever/infection during anticipated neutropenic period  Description: INTERVENTIONS  - Monitor WBC  - Administer growth factors as ordered  - Implement neutropenic guidelines  Outcome: Progressing     Problem: SAFETY ADULT - FALL  Goal: Free from fall injury  Description: INTERVENTIONS:  - Assess pt frequently for physical needs  - Identify cognitive and physical deficits and behaviors that affect risk of falls.  - [place] fall precautions as indicated by assessment.  - Educate pt/family on patient safety including physical limitations  - Instruct pt to call for assistance with activity based on assessment  - Modify environment to reduce risk of injury  - Provide assistive devices as appropriate  - Consider OT/PT consult to assist with strengthening/mobility  - Encourage toileting schedule  Outcome: Progressing     Problem: DISCHARGE PLANNING  Goal: Discharge to home or other facility with appropriate resources  Description: INTERVENTIONS:  - Identify barriers to discharge w/pt and caregiver  - Include patient/family/discharge partner in discharge planning  - Arrange for needed discharge resources and transportation as appropriate  - Identify discharge learning needs (meds, wound care, etc)  - Arrange for interpreters to assist at discharge as needed  - Consider post-discharge preferences of patient/family/discharge partner  - Complete POLST form as appropriate  - Assess patient's ability to be responsible for managing their own health  - Refer to Case Management Department for coordinating discharge planning if the patient needs post-hospital services based on  physician/LIP order or complex needs related to functional status, cognitive ability or social support system  Outcome: Progressing     Monitoring vital signs, stable at this time. No acute changes at this moment.  Fall precautions in place- bed locked in lowest position, call light within reach. Frequent rounding by nursing staff.

## 2024-09-04 NOTE — H&P
[facility]  part of [facility]    History and Physical    Jose Rafael Oscar Patient Status:  Inpatient    1955 MRN [id number]   Location [facility]5W Attending Yaneli Kumar MD   Hosp Day # 0 PCP Colleen Weiler, DO     Date:  9/3/2024  Date of Admission:  9/3/2024    History provided by:patient  HPI:     Chief Complaint   Patient presents with    Cough/URI     HPI    69-year-old gentleman with significant past medical history of hypertension, hyperlipidemia who presents to the emergency room for cough, chest pain and headache.  Reports that symptoms started over the weekend.  Recently returned from a trip to [place] last week.  In the ED complete blood count notable for white blood cell count of 13.1.  Metabolic panel overall unremarkable.  Troponin negative.  D-dimer negative.  Lactic acid normal.  SIRS criteria met with tachypnea, fever of 101.2 and white blood cell count of 13.  Blood cultures taken.  Chest x-ray revealed left lower lobe pneumonia.  Started on Augmentin and azithromycin.  Admitted for further treatment and evaluation.    History     Past Medical History:    Colon polyp    Pathology came back negative    Colon polyps    polyps, colon    Cyst of neck    3/22/17 Right Neck Cyst    Essential hypertension    Taking Hydrochlorothiazide 12.5 mg    High blood pressure    High cholesterol    Hyperlipidemia    Torn rotator cuff    rotator cuff tear (right).   Rotator cuff repair     Past Surgical History:   Procedure Laterality Date    Colonoscopy   then every 5 years    Colonoscopy N/A 2019    Procedure: COLONOSCOPY;  Surgeon: Lui Humphrey MD;  Location: [facility] ENDOSCOPY    Colonoscopy      Colonoscopy N/A 1/3/2020    Procedure: COLONOSCOPY;  Surgeon: Lui Humphrey MD;  Location: [facility] ENDOSCOPY    Exc skin benig 2.1-3cm remaindr body Right 3/22/17    Repair rotator cuff,chronic Right     rotator cuff repair.   rotator cuff tear  (right).  Lawrence Heredia    Repr cmpl wnd head,fac,hand 2.6-7.5 Right 3/22/17    Tonsillectomy  1971     Family History   Problem Relation Age of Onset    Other (brain aneurysm) Father 47        47 onset (cause of death)    Colon Cancer Cousin 61        onset 61 (cause of death)    Heart Disease Mother 65        CAD, CABG x4, onset @ 65    Rash Sister         scleroderma    Lipids Other         hyperlipidemia, family h/o    Cancer Paternal Grandmother         She  from it     Social History:  Social History     Socioeconomic History    Marital status:    Tobacco Use    Smoking status: Never     Passive exposure: Never    Smokeless tobacco: Never   Vaping Use    Vaping status: Never Used   Substance and Sexual Activity    Alcohol use: Yes     Comment: Socially    Drug use: No   Other Topics Concern    Caffeine Concern Yes     Comment: soda     Social Determinants of Health     Financial Resource Strain: Low Risk  (2024)    Financial Resource Strain     Difficulty of Paying Living Expenses: Not hard at all   Transportation Needs: No Transportation Needs (2024)    Transportation Needs     Lack of Transportation: No     Allergies/Medications:   Allergies: No Known Allergies  Medications Prior to Admission   Medication Sig    [] ibuprofen 600 MG Oral Tab Take 1 tablet (600 mg total) by mouth every 8 (eight) hours as needed for Pain or Fever.    [] HYDROcodone-acetaminophen 5-325 MG Oral Tab Take 1 tablet by mouth every 6 (six) hours as needed for Pain. (Patient not taking: Reported on 2024)    hydroCHLOROthiazide 25 MG Oral Tab Take 1 tablet (25 mg total) by mouth daily.    simvastatin 20 MG Oral Tab Take 1 tablet (20 mg total) by mouth daily.    Linolenic Acid (OMEGA 3 OR) Take by mouth.    Multiple Vitamins-Minerals (MULTI-VITAMIN/MINERALS) Oral Tab Take 1 tablet by mouth daily. Focus factor    aspirin 81 MG Oral Tab Take 1 tablet (81 mg total) by mouth daily.       Review of Systems:      Constitutional:  Positive for fatigue and fever.   HENT: Negative.     Eyes: Negative.    Respiratory:  Positive for cough, chest tightness and shortness of breath.    Cardiovascular: Negative.    Gastrointestinal: Negative.    Endocrine: Negative.    Genitourinary: Negative.    Musculoskeletal: Negative.    Skin: Negative.    Allergic/Immunologic: Negative.    Neurological: Negative.    Hematological: Negative.    Psychiatric/Behavioral: Negative.         Physical Exam:   Vital Signs:  Blood pressure 141/88, pulse 95, temperature (!) 101.2 °F (38.4 °C), temperature source Oral, resp. rate 22, weight 190 lb 14.7 oz (86.6 kg), SpO2 95%.  Physical Exam  HENT:      Head: Normocephalic.      Right Ear: Tympanic membrane normal.      Left Ear: Tympanic membrane normal.      Nose: Nose normal.      Mouth/Throat:      Mouth: Mucous membranes are moist.   Cardiovascular:      Rate and Rhythm: Normal rate.   Pulmonary:      Effort: Respiratory distress present.      Breath sounds: Rales present.   Abdominal:      General: Abdomen is flat.      Palpations: Abdomen is soft.   Musculoskeletal:         General: Normal range of motion.   Skin:     General: Skin is warm.   Neurological:      General: No focal deficit present.      Mental Status: He is alert and oriented to person, place, and time.   Psychiatric:         Mood and Affect: Mood normal.           Results:     Lab Results   Component Value Date    WBC 13.1 (H) 09/03/2024    HGB 16.4 09/03/2024    HCT 47.9 09/03/2024    .0 09/03/2024    CREATSERUM 1.15 09/03/2024    BUN 13 09/03/2024     09/03/2024    K 4.0 09/03/2024     09/03/2024    CO2 29.0 09/03/2024     (H) 09/03/2024    CA 9.9 09/03/2024    ALB 5.1 (H) 09/03/2024    ALKPHO 59 09/03/2024    BILT 0.7 09/03/2024    TP 8.4 (H) 09/03/2024    AST 18 09/03/2024    ALT 26 09/03/2024    TSH 2.764 04/16/2024    PSA 0.5 08/28/2018    DDIMER 0.55 09/03/2024    MG 1.9 08/27/2024    TROPHS 3  09/03/2024    B12 245 09/18/2018     XR CHEST AP PORTABLE  (CPT=71045)    Result Date: 9/3/2024  CONCLUSION:  1. Left lower lobe pneumonia and or atelectasis. 2. Probable subsegmental atelectasis in right lower lobe.     Dictated by (CST): Juanpablo Hernandez MD on 9/03/2024 at 8:35 PM     Finalized by (CST): Juanpablo Hernandez MD on 9/03/2024 at 8:37 PM         EKG 12 Lead    Result Date: 9/3/2024  Normal sinus rhythm Inferior infarct , age undetermined Abnormal ECG No previous ECGs found in [facility]     Assessment/Plan:     Pneumonia of left lower lobe due to infectious organism    Sepsis, due to unspecified organism, unspecified whether acute organ dysfunction present (HCC)  -   Sepsis protocol ordered.  -Continue 0.9 normal saline  - on Augmentin and azithromycin.    Hyperension  Monitor in the setting of infection/sepsis.      diet: general  ivf: 0.9 NS  dvt prophylaxis: heparin subq  antibiotics: azithromycin and augmentin.  tubes: none  central lines: none  code status: full code  dispo: home upon medical clearance    Greater than 70 minutes, >50% time spent counseling and coordinating care regarding treatment and workup.        **Certification      PHYSICIAN Certification of Need for Inpatient Hospitalization - Initial Certification    Patient will require inpatient services that will reasonably be expected to span two midnight's based on the clinical documentation in H+P.   Based on patients current state of illness, I anticipate that, after discharge, patient will require TBD.        Anibal Rachel MD  9/3/2024

## 2024-09-05 LAB
ANION GAP SERPL CALC-SCNC: 5 MMOL/L (ref 0–18)
BASOPHILS # BLD AUTO: 0.03 X10(3) UL (ref 0–0.2)
BASOPHILS NFR BLD AUTO: 0.3 %
BUN BLD-MCNC: 12 MG/DL (ref 9–23)
BUN/CREAT SERPL: 10.9 (ref 10–20)
CALCIUM BLD-MCNC: 8.5 MG/DL (ref 8.7–10.4)
CHLORIDE SERPL-SCNC: 106 MMOL/L (ref 98–112)
CO2 SERPL-SCNC: 26 MMOL/L (ref 21–32)
CREAT BLD-MCNC: 1.1 MG/DL
DEPRECATED RDW RBC AUTO: 44.1 FL (ref 35.1–46.3)
EGFRCR SERPLBLD CKD-EPI 2021: 72 ML/MIN/1.73M2 (ref 60–?)
EOSINOPHIL # BLD AUTO: 0.01 X10(3) UL (ref 0–0.7)
EOSINOPHIL NFR BLD AUTO: 0.1 %
ERYTHROCYTE [DISTWIDTH] IN BLOOD BY AUTOMATED COUNT: 13 % (ref 11–15)
GLUCOSE BLD-MCNC: 117 MG/DL (ref 70–99)
HCT VFR BLD AUTO: 40 %
HGB BLD-MCNC: 13.6 G/DL
IMM GRANULOCYTES # BLD AUTO: 0.07 X10(3) UL (ref 0–1)
IMM GRANULOCYTES NFR BLD: 0.6 %
LYMPHOCYTES # BLD AUTO: 0.9 X10(3) UL (ref 1–4)
LYMPHOCYTES NFR BLD AUTO: 7.9 %
MCH RBC QN AUTO: 31.3 PG (ref 26–34)
MCHC RBC AUTO-ENTMCNC: 34 G/DL (ref 31–37)
MCV RBC AUTO: 92 FL
MONOCYTES # BLD AUTO: 1.48 X10(3) UL (ref 0.1–1)
MONOCYTES NFR BLD AUTO: 13.1 %
NEUTROPHILS # BLD AUTO: 8.85 X10 (3) UL (ref 1.5–7.7)
NEUTROPHILS # BLD AUTO: 8.85 X10(3) UL (ref 1.5–7.7)
NEUTROPHILS NFR BLD AUTO: 78 %
OSMOLALITY SERPL CALC.SUM OF ELEC: 285 MOSM/KG (ref 275–295)
PLATELET # BLD AUTO: 157 10(3)UL (ref 150–450)
POTASSIUM SERPL-SCNC: 4.2 MMOL/L (ref 3.5–5.1)
RBC # BLD AUTO: 4.35 X10(6)UL
SODIUM SERPL-SCNC: 137 MMOL/L (ref 136–145)
WBC # BLD AUTO: 11.3 X10(3) UL (ref 4–11)

## 2024-09-05 PROCEDURE — 99232 SBSQ HOSP IP/OBS MODERATE 35: CPT | Performed by: INTERNAL MEDICINE

## 2024-09-05 PROCEDURE — 99233 SBSQ HOSP IP/OBS HIGH 50: CPT | Performed by: INTERNAL MEDICINE

## 2024-09-05 NOTE — PROGRESS NOTES
[facility]  part of [facility]     Progress Note    Jose Rafael Oscar Patient Status:  Inpatient    1955 MRN [id number]   Location [facility]5W Attending Dawit Anthony MD   Hosp Day # 2 PCP Colleen Weiler,        Subjective:   Patient admits to improvement in cough and chest congestion.  Tmax 101.1 degrees over last 24 hours    Objective:   Blood pressure (!) 148/94, pulse 85, temperature 100.3 °F (37.9 °C), temperature source Oral, resp. rate 18, height 5' 4\" (1.626 m), weight 182 lb 9.6 oz (82.8 kg), SpO2 96%.  Intake/Output:   Last 3 shifts: I/O last 3 completed shifts:  In: 5048.3 [P.O.:900; I.V.:4048.3; IV PIGGYBACK:100]  Out: 1150 [Urine:1150]   This shift: No intake/output data recorded.     Vent Settings:      Hemodynamic parameters (last 24 hours):      Scheduled Meds:   Current Facility-Administered Medications   Medication Dose Route Frequency    cefTRIAXone (Rocephin) 2 g in sodium chloride 0.9% 100 mL IVPB-ADDV  2 g Intravenous Q24H    azithromycin (Zithromax) tab 500 mg  500 mg Oral Daily    guaiFENesin (Robitussin) 100 MG/5 ML oral liquid 200 mg  200 mg Oral Q4H PRN    benzonatate (Tessalon) cap 200 mg  200 mg Oral TID PRN    sodium chloride 0.9% infusion   Intravenous Continuous    heparin (Porcine) 5000 UNIT/ML injection 5,000 Units  5,000 Units Subcutaneous 2 times per day    acetaminophen (Tylenol Extra Strength) tab 500 mg  500 mg Oral Q4H PRN    atorvastatin (Lipitor) tab 10 mg  10 mg Oral Nightly       Continuous Infusions:    sodium chloride 100 mL/hr at 24 0150       Physical Exam  Constitutional: no acute distress  Eyes: PERRL  ENT: nares pateint  Neck: supple, no JVD  Cardio: RRR, S1 S2  Respiratory: clear to auscultation bilaterally, no wheezing, rales, rhonchi, crackles  GI: abdomen soft, non tender, active bowel sounds, no organomegaly  Extremities: no clubbing, cyanosis, edema  Neurologic: no gross motor deficits  Skin: warm,  dry      Results:     Lab Results   Component Value Date    WBC 11.3 09/05/2024    HGB 13.6 09/05/2024    HCT 40.0 09/05/2024    .0 09/05/2024    CREATSERUM 1.10 09/05/2024    BUN 12 09/05/2024     09/05/2024    K 4.2 09/05/2024     09/05/2024    CO2 26.0 09/05/2024     09/05/2024    CA 8.5 09/05/2024       XR CHEST AP PORTABLE  (CPT=71045)    Result Date: 9/3/2024  CONCLUSION:  1. Left lower lobe pneumonia and or atelectasis. 2. Probable subsegmental atelectasis in right lower lobe.     Dictated by (CST): Juanpablo Hernandez MD on 9/03/2024 at 8:35 PM     Finalized by (CST): Juanpablo Hernandez MD on 9/03/2024 at 8:37 PM           EKG 12 Lead    Result Date: 9/4/2024  Normal sinus rhythm Normal ECGg No previous ECGs found in [facility] Confirmed by EDWIN OSBORN ELMER ([id number]) on 9/4/2024 12:03:55 PM     Assessment   1.  Left lower lobe pneumonia  2.  Fevers  3.  Hypertension  4.  Leukocytosis     Plan   -Patient presents with evidence of some cough fevers after returning from travel from [place] 1 week prior.  -Chest x-ray on presentation with left lower lobe infiltrate seen concerning for pneumonia  -Continue empiric antibiotic therapy at this time  -Blood cultures thus far with no growth to date  -Follow fever curve.  Some ongoing fevers over the last 24 hours  -Weaned off oxygen at this time  -DVT prophylaxis: Heparin    Halie Dykes DO  Pulmonary Critical Care Medicine  [facility]

## 2024-09-05 NOTE — PAYOR COMM NOTE
--------------  ADMISSION REVIEW     Payor: [place] [facility]  Subscriber #:  [id number]  Authorization Number: [id number]    Admit date: 9/3/24  Admit time: 11:09 PM       REVIEW DOCUMENTATION:     ED Provider Notes        ED Provider Notes signed by Carmen Childress DO at 2024  1:31 AM       Author: Carmen Childress DO Service: -- Author Type: Physician    Filed: 2024  1:31 AM Date of Service: 9/3/2024  8:19 PM Status: Signed    : Carmen Childress DO (Physician)         [place] Emergency Department Note  Patient: Jose Rafael Oscar Age: 69 year old Sex: male      MRN: [id number]  : 1955    Patient Seen in: [facility] Emergency Department    History     Chief Complaint   Patient presents with    Cough/URI     Stated Complaint: chest pain, cough, headache    History obtained from: patient     69M hx of HTN and HLD presents to the ED with complaints of cough, fever, fatigue.  Patient states that for the past 2 days he has been feeling unwell, reports chest congestion and tightness worse with coughing.  States when he coughs he will have a brief frontal headache that then resolves when he is not coughing.  He denies any constant or thunderclap headache.  He denies numbness or tingling in his extremities.  Does report subjective fever and chills and bodyaches.  He reports feeling slightly short of breath and has been coughing.  Cough is mostly dry though occasionally productive of sputum though no hemoptysis.  He denies history of DVT or PE.  Currently traveled from [place] last week.  Denies history of cancer.     Review of Systems:  Review of Systems  Positive for stated complaint: chest pain, cough, headache. Constitutional and vital signs reviewed. All other systems reviewed and negative except as noted above.    Patient History:  Past Medical History:    Colon polyp    Pathology came back negative    Colon polyps    polyps, colon    Cyst of neck    3/22/17 Right Neck Cyst    Essential  hypertension    Taking Hydrochlorothiazide 12.5 mg    High blood pressure    High cholesterol    Hyperlipidemia    Torn rotator cuff    rotator cuff tear (right).   Rotator cuff repair       Past Surgical History:   Procedure Laterality Date    Colonoscopy   then every 5 years    Colonoscopy N/A 2019    Procedure: COLONOSCOPY;  Surgeon: Lui Humphrey MD;  Location: [facility] ENDOSCOPY    Colonoscopy      Colonoscopy N/A 1/3/2020    Procedure: COLONOSCOPY;  Surgeon: Lui Humphrey MD;  Location: [facility] ENDOSCOPY    Exc skin benig 2.1-3cm remaindr body Right 3/22/17    Repair rotator cuff,chronic Right     rotator cuff repair.   rotator cuff tear (right).  Lawrence Tu    Repr cmpl wnd head,fac,hand 2.6-7.5 Right 3/22/17    Tonsillectomy  1971        Family History   Problem Relation Age of Onset    Other (brain aneurysm) Father 47        47 onset (cause of death)    Colon Cancer Cousin 61        onset 61 (cause of death)    Heart Disease Mother 65        CAD, CABG x4, onset @ 65    Rash Sister         scleroderma    Lipids Other         hyperlipidemia, family h/o    Cancer Paternal Grandmother         She  from it       Specific Social Determinants of Health:   Social History     Socioeconomic History    Marital status:    Tobacco Use    Smoking status: Never     Passive exposure: Never    Smokeless tobacco: Never   Vaping Use    Vaping status: Never Used   Substance and Sexual Activity    Alcohol use: Yes     Comment: Socially    Drug use: No   Other Topics Concern    Caffeine Concern Yes     Comment: soda     Social Determinants of Health     Financial Resource Strain: Low Risk  (2024)    Financial Resource Strain     Difficulty of Paying Living Expenses: Not hard at all   Food Insecurity: No Food Insecurity (2024)    Food Insecurity     Food Insecurity: Never true   Transportation Needs: No Transportation Needs (2024)    Transportation Needs     Lack of Transportation: No    Housing Stability: Low Risk  (9/4/2024)    Housing Stability     Housing Instability: No           PSFH elements reviewed from today and agreed except as otherwise stated in HPI.    Physical Exam     ED Triage Vitals [09/03/24 1858]   /85   Pulse 97   Resp 24   Temp 98.9 °F (37.2 °C)   Temp src Temporal   SpO2 98 %   O2 Device None (Room air)       Current:/83 (BP Location: Right arm)   Pulse 95   Temp 98 °F (36.7 °C) (Oral)   Resp 16   Ht 162.6 cm (5' 4\")   Wt 82.8 kg   SpO2 95%   BMI 31.34 kg/m²         Physical Exam  Vitals and nursing note reviewed.   Constitutional:       General: He is not in acute distress.     Appearance: He is not ill-appearing.   HENT:      Head: Normocephalic and atraumatic.      Nose: Nose normal.      Mouth/Throat:      Mouth: Mucous membranes are moist.      Pharynx: Oropharynx is clear.   Eyes:      Conjunctiva/sclera: Conjunctivae normal.   Cardiovascular:      Rate and Rhythm: Regular rhythm. Tachycardia present.      Heart sounds: No murmur heard.     Comments: 2+ radial and dp pulses bilaterally   Pulmonary:      Breath sounds: No stridor.      Comments: Pt tachypneic, coarse crackles at L lung base.   Abdominal:      General: There is no distension.      Palpations: Abdomen is soft.      Tenderness: There is no abdominal tenderness. There is no guarding or rebound.   Musculoskeletal:      Right lower leg: No edema.      Left lower leg: No edema.   Skin:     General: Skin is warm and dry.      Capillary Refill: Capillary refill takes less than 2 seconds.   Neurological:      General: No focal deficit present.      Mental Status: He is alert.         ED Course   Labs:   Labs Reviewed   CBC WITH DIFFERENTIAL WITH PLATELET - Abnormal; Notable for the following components:       Result Value    WBC 13.1 (*)     Neutrophil Absolute Prelim 9.98 (*)     Neutrophil Absolute 9.98 (*)     Monocyte Absolute 1.82 (*)     All other components within normal limits   COMP  METABOLIC PANEL (14) - Abnormal; Notable for the following components:    Glucose 115 (*)     Total Protein 8.4 (*)     Albumin 5.1 (*)     All other components within normal limits   TROPONIN I HIGH SENSITIVITY - Normal   LACTIC ACID, PLASMA - Normal   D-DIMER - Normal   RAPID SARS-COV-2 BY PCR - Normal   COMP METABOLIC PANEL (14)   CBC WITH DIFFERENTIAL WITH PLATELET   RAINBOW DRAW BLUE   BLOOD CULTURE   BLOOD CULTURE     Radiology findings:  I personally reviewed chest x-ray on my interpretation there appears to be possible multifocal pneumonia, no consolidation or pneumothorax      XR CHEST AP PORTABLE  (CPT=71045)    Result Date: 9/3/2024  CONCLUSION:  1. Left lower lobe pneumonia and or atelectasis. 2. Probable subsegmental atelectasis in right lower lobe.     Dictated by (CST): Juanpablo Hernandez MD on 9/03/2024 at 8:35 PM     Finalized by (CST): Juanpablo Hernandez MD on 9/03/2024 at 8:37 PM           EKG as interpreted by me: Ecg my interpretation shows nsr rate 96 bpm, normal axis, normal intervals, qtc 430 ms, Q waves in lead III and AVF, no stemi   Cardiac Monitor: Interpreted by me.   Pulse Readings from Last 1 Encounters:   09/03/24 95   , sinus,       MDM   69M hx of HTN and HLD presents to the ED with complaints of cough, fever, fatigue. Pt tachypneic and febrile on my assessment RR of 26-30.     Differential diagnoses considered includes, but is not limited to:   Viral syndrome  Pneumonia  Myocarditis  Pericarditis   PE - cannot exclude given age and tachycardia     Will obtain the following tests: cbc, cmp, trop, lactic, dimer, cov, cxr, ecg  Will administer the following medications/therapies: 5mg albuterol neb, IV NS bolus, tylenol     Please see ED course for my independent review of these tests/imaging results.    Chronic conditions affecting care: HTN and HLD     ED Course as of 09/04/24 0130  ------------------------------------------------------------  Time: 09/03 1946  Value: Rapid SARS-CoV-2 by  PCR: Not Detected  Comment: (Reviewed)  ------------------------------------------------------------  Time: 09/03 2001  Comment: Ecg my interpretation shows nsr rate 96 bpm, normal axis, normal intervals, qtc 430 ms, Q waves in lead III and AVF, no stemi   ------------------------------------------------------------  Time: 09/03 2004  Value: Temp(!): 101.2 °F (38.4 °C)  Comment: (Reviewed)  ------------------------------------------------------------  Time: 09/03 2005  Value: Rapid SARS-CoV-2 by PCR: Not Detected  Comment: (Reviewed)  ------------------------------------------------------------  Time: 09/03 2027  Value: WBC(!): 13.1  Comment: (Reviewed)  ------------------------------------------------------------  Time: 09/03 2037  Value: XR CHEST AP PORTABLE  (CPT=71045)  Comment: Cxr looks c/w pneumonia on left side, no pneumothorax or consolidation   ------------------------------------------------------------  Time: 09/03 2038  Value: XR CHEST AP PORTABLE  (CPT=71045)  Comment: LLL pneumonia.  Patient reassessed.  Updated with results.  He still quite tachypneic, satting about 94% on room air otherwise placed on 2 L nasal cannula for comfort.  States he feels a little bit better after breathing treatment.  Given his leukocytosis, tachypnea, age greater than 65 by curb 65 he is moderate risk for worsening outcomes with his pneumonia.  After shared decision making we will keep for admission for antibiotics.  Blood cultures ordered.  His lactic acid is normal. Case d/w Dr. Kumar she accepts for observation comfortable with the plan.   ------------------------------------------------------------  Time: 09/03 2040  Comment: Cmp unremarkable   ------------------------------------------------------------  Time: 09/03 2202  Value: D-Dimer: 0.55  Comment: Dimer WNL.             Procedures:  Procedures      CURB-65: +1 age, +1 tachypnea RR >30  Moderate risk group with 6.8% 30 day mortality     Disposition and Plan      Clinical Impression:  1. Pneumonia of left lower lobe due to infectious organism    2. Fever, unspecified fever cause    3. Sepsis, due to unspecified organism, unspecified whether acute organ dysfunction present (HCC)        Disposition:  Admit    Hospital Problems       Present on Admission  Date Reviewed: 8/27/2024            ICD-10-CM Noted POA    * (Principal) Pneumonia of left lower lobe due to infectious organism J18.9 9/3/2024 Unknown    Fever, unspecified fever cause R50.9 9/3/2024 Unknown    Sepsis, due to unspecified organism, unspecified whether acute organ dysfunction present (HCC) A41.9 9/3/2024 Unknown        This note may have been created using voice dictation technology and may include inadvertent errors.      Carmen Childress DO  Attending Physician   Emergency Medicine           Signed by Carmen Childress DO on 9/4/2024  1:31 AM         MEDICATIONS ADMINISTERED IN LAST 1 DAY:  acetaminophen (Tylenol Extra Strength) tab 500 mg       Date Action Dose Route User    9/4/2024 2023 Given 500 mg Oral Quinn Servin RN    9/4/2024 1542 Given 500 mg Oral Rae Ramirez RN          atorvastatin (Lipitor) tab 10 mg       Date Action Dose Route User    9/4/2024 2022 Given 10 mg Oral Quinn Servin RN          azithromycin (Zithromax) tab 500 mg       Date Action Dose Route User    9/5/2024 0833 Given 500 mg Oral Loreta Carvalho RN          benzonatate (Tessalon) cap 200 mg       Date Action Dose Route User    9/4/2024 2023 Given 200 mg Oral Quinn Servin RN          cefTRIAXone (Rocephin) 2 g in sodium chloride 0.9% 100 mL IVPB-ADDV       Date Action Dose Route User    9/5/2024 1322 New Bag 2 g Intravenous Loreta Carvalho RN          heparin (Porcine) 5000 UNIT/ML injection 5,000 Units       Date Action Dose Route User    9/5/2024 0833 Given 5,000 Units Subcutaneous (Right Upper Arm) Loreta Carvalho RN    9/4/2024 2022 Given 5,000 Units Subcutaneous (Right Upper Arm) Quinn Servin RN          sodium  chloride 0.9% infusion       Date Action Dose Route User    9/5/2024 0150 New Bag (none) Intravenous Quinn Servin, CHANDRA            Vitals (last day)       Date/Time Temp Pulse Resp BP SpO2 Weight O2 Device O2 Flow Rate (L/min) [facility]    09/05/24 1331 100.1 °F (37.8 °C) 78 20 123/77 97 % -- -- --     09/05/24 1331 -- -- -- -- -- -- None (Room air) -- SC    09/05/24 0838 100.3 °F (37.9 °C) 85 18 148/94 96 % -- None (Room air) --     09/05/24 0150 -- 88 -- -- 93 % -- -- --     09/04/24 2019 100 °F (37.8 °C) 89 22 152/81 93 % -- None (Room air) --     09/04/24 1539 101.1 °F (38.4 °C) 90 20 136/76 93 % -- None (Room air) --     09/04/24 0814 99.5 °F (37.5 °C) 83 18 128/67 94 % -- None (Room air) --     09/04/24 0539 102.9 °F (39.4 °C) 91 16 155/87 96 % -- Nasal cannula 2 L/min     09/04/24 0008 -- -- -- -- -- 182 lb 9.6 oz (82.8 kg) -- -- AD    09/04/24 0007 -- 88 -- -- -- -- -- -- KD       H&P     69-year-old gentleman with significant past medical history of hypertension, hyperlipidemia who presents to the emergency room for cough, chest pain and headache.  Reports that symptoms started over the weekend.  Recently returned from a trip to [place] last week.  In the ED complete blood count notable for white blood cell count of 13.1.  Metabolic panel overall unremarkable.  Troponin negative.  D-dimer negative.  Lactic acid normal.  SIRS criteria met with tachypnea, fever of 101.2 and white blood cell count of 13.  Blood cultures taken.  Chest x-ray revealed left lower lobe pneumonia.  Started on Augmentin and azithromycin.  Admitted for further treatment and evaluation.       Assessment/Plan:     Pneumonia of left lower lobe due to infectious organism    Sepsis, due to unspecified organism, unspecified whether acute organ dysfunction present (HCC)  -   Sepsis protocol ordered.  -Continue 0.9 normal saline  - on Augmentin and azithromycin.     Hyperension  Monitor in the setting of infection/sepsis.    9/4  PULMONARY CONSULT NOTE    Reason for Consultation:   Pneumonia     History of Present Illness:   Patient is a 69-year-old male with past medical history significant for hypertension who presents with chief complaint of cough and some chest congestion.  Recently returned from travel from [place] 1 week prior.  Evidence of fevers with Tmax 102.9 degrees noted during hospital course.  Denies history of known lung disease.  Denies significant wheezing.  Some occasional cough present.  Chest x-ray concerning for left lower lobe pneumonia.  Saturation stable on room air.         Physical Exam:   Blood pressure 128/67, pulse 83, temperature 99.5 °F (37.5 °C), temperature source Oral, resp. rate 18, height 5' 4\" (1.626 m), weight 182 lb 9.6 oz (82.8 kg), SpO2 94%.       Results:   Laboratory Data        Lab Results   Component Value Date     WBC 12.9 (H) 09/04/2024     HGB 15.1 09/04/2024     HCT 42.5 09/04/2024     .0 09/04/2024     CREATSERUM 1.16 09/04/2024     BUN 12 09/04/2024      (L) 09/04/2024     K 4.1 09/04/2024      09/04/2024     CO2 26.0 09/04/2024      (H) 09/04/2024     CA 9.0 09/04/2024     ALB 4.3 09/04/2024     ALKPHO 47 09/04/2024     TP 7.2 09/04/2024     AST 14 09/04/2024     ALT 20 09/04/2024       Assessment   1.  Left lower lobe pneumonia  2.  Fevers  3.  Hypertension  4.  Leukocytosis     Plan   -Patient presents with evidence of some cough fevers after returning from travel from [place] 1 week prior.  -Chest x-ray on presentation with left lower lobe infiltrate seen concerning for pneumonia  -Continue empiric antibiotic therapy at this time  -Blood cultures pending  -IV hydration  -Follow fever curve  -Weaned off oxygen at this time  -Reviewed vitals, labs and imaging    9/4 INTERNAL MED NOTE  Patient denies acute events overnight.  Patient continues to have cough, nonproductive.  Denies current subjective shortness of breath.  Patient does feel feverish.            Objective:  Vital signs:  Vitals          Vitals:     09/04/24 0007 09/04/24 0008 09/04/24 0539 09/04/24 0814   BP:     155/87 128/67   BP Location:     Right arm Right arm   Pulse: 88   91 83   Resp:     16 18   Temp:     (!) 102.9 °F (39.4 °C) 99.5 °F (37.5 °C)   TempSrc:     Oral Oral   SpO2:     96% 94%               Recent Labs   Lab 09/03/24 1957 09/04/24  0537   WBC 13.1* 12.9*   HGB 16.4 15.1   MCV 92.1 89.1   .0 177.0              Recent Labs   Lab 09/03/24 1957 09/04/24  0537   * 131*   BUN 13 12   CREATSERUM 1.15 1.16   CA 9.9 9.0   ALB 5.1* 4.3    135*   K 4.0 4.1    102   CO2 29.0 26.0   ALKPHO 59 47   AST 18 14   ALT 26 20   BILT 0.7 1.0   TP 8.4* 7.2          Assessment & Plan:  Pneumonia of left lower lobe due to infectious organism    Sepsis, due to unspecified organism, unspecified whether acute organ dysfunction present (HCC)  -   Sepsis protocol ordered.  -Continue 0.9 normal saline  -Initially started on Augmentin and azithromycin.  -Patient with persistent fevers this a.m.  Change Augmentin to IV ceftriaxone.  -Pulmonology on consult, appreciate further recommendations     Hypertension  -Patient currently normotensive  -Holding antihypertensive agents at this time  - Monitor and add agents as needed     HLD  -Continue statin    9/5 PULMONARY NOTE       Subjective:   Patient admits to improvement in cough and chest congestion.  Tmax 101.1 degrees over last 24 hours     Objective:   Blood pressure (!) 148/94, pulse 85, temperature 100.3 °F (37.9 °C), temperature source Oral, resp. rate 18, height 5' 4\" (1.626 m), weight 182 lb 9.6 oz (82.8 kg), SpO2 96%.            Lab Results   Component Value Date     WBC 11.3 09/05/2024     HGB 13.6 09/05/2024     HCT 40.0 09/05/2024     .0 09/05/2024     CREATSERUM 1.10 09/05/2024     BUN 12 09/05/2024      09/05/2024     K 4.2 09/05/2024      09/05/2024     CO2 26.0 09/05/2024      09/05/2024     CA 8.5  09/05/2024        Assessment   1.  Left lower lobe pneumonia  2.  Fevers  3.  Hypertension  4.  Leukocytosis      Plan   -Patient presents with evidence of some cough fevers after returning from travel from [place] 1 week prior.  -Chest x-ray on presentation with left lower lobe infiltrate seen concerning for pneumonia  -Continue empiric antibiotic therapy at this time  -Blood cultures thus far with no growth to date  -Follow fever curve.  Some ongoing fevers over the last 24 hours  -Weaned off oxygen at this time  -DVT prophylaxis: Heparin    9/5 INTERNAL MED NOTE    Subjective:  Patient seen lying in bed.  No family at bedside.  Patient denies acute events overnight.  Patient reports he is feeling better today.  Remains without shortness of breath.  Reports his cough is improved.          Objective:  Vital signs:  Vitals          Vitals:     09/04/24 1539 09/04/24 2019 09/05/24 0150 09/05/24 0838   BP: 136/76 152/81 (P) 140/81 (!) 148/94   BP Location: Left arm Left arm (P) Left arm Right arm   Pulse: 90 89 88 85   Resp: 20 22 (P) 20 18   Temp: (!) 101.1 °F (38.4 °C) 100 °F (37.8 °C) (P) 99.5 °F (37.5 °C) 100.3 °F (37.9 °C)   TempSrc: Oral Oral (P) Oral Oral   SpO2: 93% 93% 93% 96%                Recent Labs   Lab 09/03/24 1957 09/04/24  0537 09/05/24  0458   WBC 13.1* 12.9* 11.3*   HGB 16.4 15.1 13.6   MCV 92.1 89.1 92.0   .0 177.0 157.0               Recent Labs   Lab 09/03/24 1957 09/04/24  0537 09/05/24  0458   * 131* 117*   BUN 13 12 12   CREATSERUM 1.15 1.16 1.10   CA 9.9 9.0 8.5*   ALB 5.1* 4.3  --     135* 137   K 4.0 4.1 4.2    102 106   CO2 29.0 26.0 26.0   ALKPHO 59 47  --    AST 18 14  --    ALT 26 20  --    BILT 0.7 1.0  --    TP 8.4* 7.2  --        Medications:   Scheduled Medications    cefTRIAXone  2 g Intravenous Q24H    azithromycin  500 mg Oral Daily    heparin  5,000 Units Subcutaneous 2 times per day    atorvastatin  10 mg Oral Nightly                  Assessment &  Plan:  Pneumonia of left lower lobe due to infectious organism    Sepsis, due to unspecified organism, unspecified whether acute organ dysfunction present (HCC)  -   Sepsis protocol ordered.  -Initially started on Augmentin and azithromycin.  -Patient with persistent fevers   -Antibiotics changed to IV ceftriaxone.  -Improving today  -Continues to have low-grade temperatures  -Blood cultures no growth to date  -Pulmonology on consult, appreciate further recommendations     Hypertension  -Patient currently normotensive  -Holding antihypertensive agents at this time  - Monitor and add agents as needed     HLD  -Continue statin

## 2024-09-05 NOTE — PLAN OF CARE
Problem: Patient Centered Care  Goal: Patient preferences are identified and integrated in the patient's plan of care  Description: Interventions:  - What would you like us to know as we care for you?   - Provide timely, complete, and accurate information to patient/family  - Incorporate patient and family knowledge, values, beliefs, and cultural backgrounds into the planning and delivery of care  - Encourage patient/family to participate in care and decision-making at the level they choose  - Honor patient and family perspectives and choices  Outcome: Progressing     Problem: Patient/Family Goals  Goal: Patient/Family Long Term Goal  Description: Patient's Long Term Goal: to feel better    Interventions:  - Po abx, wean o2.   - See additional Care Plan goals for specific interventions  Outcome: Progressing  Goal: Patient/Family Short Term Goal  Description: Patient's Short Term Goal: to go home    Interventions:   - dc planning  - See additional Care Plan goals for specific interventions  Outcome: Progressing     Problem: PAIN - ADULT  Goal: Verbalizes/displays adequate comfort level or patient's stated pain goal  Description: INTERVENTIONS:  - Encourage pt to monitor pain and request assistance  - Assess pain using appropriate pain scale  - Administer analgesics based on type and severity of pain and evaluate response  - Implement non-pharmacological measures as appropriate and evaluate response  - Consider cultural and social influences on pain and pain management  - Manage/alleviate anxiety  - Utilize distraction and/or relaxation techniques  - Monitor for opioid side effects  - Notify MD/LIP if interventions unsuccessful or patient reports new pain  - Anticipate increased pain with activity and pre-medicate as appropriate  Outcome: Progressing     Problem: RISK FOR INFECTION - ADULT  Goal: Absence of fever/infection during anticipated neutropenic period  Description: INTERVENTIONS  - Monitor WBC  - Administer  growth factors as ordered  - Implement neutropenic guidelines  Outcome: Progressing     Problem: SAFETY ADULT - FALL  Goal: Free from fall injury  Description: INTERVENTIONS:  - Assess pt frequently for physical needs  - Identify cognitive and physical deficits and behaviors that affect risk of falls.  - [place] fall precautions as indicated by assessment.  - Educate pt/family on patient safety including physical limitations  - Instruct pt to call for assistance with activity based on assessment  - Modify environment to reduce risk of injury  - Provide assistive devices as appropriate  - Consider OT/PT consult to assist with strengthening/mobility  - Encourage toileting schedule  Outcome: Progressing     Problem: DISCHARGE PLANNING  Goal: Discharge to home or other facility with appropriate resources  Description: INTERVENTIONS:  - Identify barriers to discharge w/pt and caregiver  - Include patient/family/discharge partner in discharge planning  - Arrange for needed discharge resources and transportation as appropriate  - Identify discharge learning needs (meds, wound care, etc)  - Arrange for interpreters to assist at discharge as needed  - Consider post-discharge preferences of patient/family/discharge partner  - Complete POLST form as appropriate  - Assess patient's ability to be responsible for managing their own health  - Refer to Case Management Department for coordinating discharge planning if the patient needs post-hospital services based on physician/LIP order or complex needs related to functional status, cognitive ability or social support system  Outcome: Progressing     Problem: RESPIRATORY - ADULT  Goal: Achieves optimal ventilation and oxygenation  Description: INTERVENTIONS:  - Assess for changes in respiratory status  - Assess for changes in mentation and behavior  - Position to facilitate oxygenation and minimize respiratory effort  - Oxygen supplementation based on oxygen saturation or ABGs  -  Provide Smoking Cessation handout, if applicable  - Encourage broncho-pulmonary hygiene including cough, deep breathe, Incentive Spirometry  - Assess the need for suctioning and perform as needed  - Assess and instruct to report SOB or any respiratory difficulty  - Respiratory Therapy support as indicated  - Manage/alleviate anxiety  - Monitor for signs/symptoms of CO2 retention  Outcome: Progressing

## 2024-09-05 NOTE — PROGRESS NOTES
[facility]  part of [facility] Critical Care Hospitalist Progress Note     Jose Rafael Oscar Patient Status:  Inpatient    1955 MRN [id number]   Location [facility]5W Attending Dawit Anthony MD   Hosp Day # 2 PCP Colleen Weiler, DO     Chief Complaint:   Chief Complaint   Patient presents with    Cough/URI        Subjective:     Patient seen lying in bed.  No family at bedside.  Patient denies acute events overnight.  Patient reports he is feeling better today.  Remains without shortness of breath.  Reports his cough is improved.    Objective:      Vital signs:  Vitals:    24 1539 24 2019 24 0150 24 0838   BP: 136/76 152/81 (P) 140/81 (!) 148/94   BP Location: Left arm Left arm (P) Left arm Right arm   Pulse: 90 89 88 85   Resp: 20 22 (P) 20 18   Temp: (!) 101.1 °F (38.4 °C) 100 °F (37.8 °C) (P) 99.5 °F (37.5 °C) 100.3 °F (37.9 °C)   TempSrc: Oral Oral (P) Oral Oral   SpO2: 93% 93% 93% 96%   Weight:       Height:           Intake/Output Summary (Last 24 hours) at 2024 1037  Last data filed at 2024 0547  Gross per 24 hour   Intake 3061.66 ml   Output 700 ml   Net 2361.66 ml           Physical Exam:    GENERAL:  Awake and alert, in no acute distress.  HEART:  Regular rhythm, regular rate  LUNGS:  Air entry was decreased, worse over left base, improving from previous.  No increased work of breathing or wheezes   ABDOMEN: Soft and non-tender.    PSYCHIATRIC: Normal mood    Diagnostic Data:    Labs:    Recent Labs   Lab 2437 24  0458   WBC 13.1* 12.9* 11.3*   HGB 16.4 15.1 13.6   MCV 92.1 89.1 92.0   .0 177.0 157.0       Recent Labs   Lab 2437 24  0458   * 131* 117*   BUN 13 12 12   CREATSERUM 1.15 1.16 1.10   CA 9.9 9.0 8.5*   ALB 5.1* 4.3  --     135* 137   K 4.0 4.1 4.2    102 106   CO2 29.0 26.0 26.0   ALKPHO 59 47  --    AST 18 14  --    ALT 26 20  --    BILT 0.7  1.0  --    TP 8.4* 7.2  --            Estimated Creatinine Clearance: 51.6 mL/min (based on SCr of 1.1 mg/dL).    No results for input(s): \"PTP\", \"INR\" in the last 168 hours.         COVID-19  Lab Results   Component Value Date    COVID19 Not Detected 09/03/2024    COVID19 Detected (A) 12/02/2023    COVID19 Detected (A) 07/21/2022       Pro-Calcitonin  No results for input(s): \"PCT\" in the last 168 hours.    Cardiac  No results for input(s): \"TROP\", \"PBNP\" in the last 168 hours.    Inflammatory Markers  Recent Labs   Lab 09/03/24 1959   DDIMER 0.55       Culture:  Hospital Encounter on 09/03/24   1. Blood Culture     Status: None (Preliminary result)    Collection Time: 09/03/24 10:17 PM    Specimen: Blood,peripheral   Result Value Ref Range    Blood Culture Result No Growth 1 Day N/A       XR CHEST AP PORTABLE  (CPT=71045)    Result Date: 9/3/2024  CONCLUSION:  1. Left lower lobe pneumonia and or atelectasis. 2. Probable subsegmental atelectasis in right lower lobe.     Dictated by (CST): Juanpablo Hernandez MD on 9/03/2024 at 8:35 PM     Finalized by (CST): Juanpablo Hernandez MD on 9/03/2024 at 8:37 PM           EKG 12 Lead    Result Date: 9/4/2024  Normal sinus rhythm Normal ECGg No previous ECGs found in [facility] Confirmed by EDWIN OSBORN ELMER ([id number]) on 9/4/2024 12:03:55 PM     Medications:    cefTRIAXone  2 g Intravenous Q24H    azithromycin  500 mg Oral Daily    heparin  5,000 Units Subcutaneous 2 times per day    atorvastatin  10 mg Oral Nightly       Assessment & Plan:       Pneumonia of left lower lobe due to infectious organism    Sepsis, due to unspecified organism, unspecified whether acute organ dysfunction present (HCC)  -   Sepsis protocol ordered.  -Initially started on Augmentin and azithromycin.  -Patient with persistent fevers   -Antibiotics changed to IV ceftriaxone.  -Improving today  -Continues to have low-grade temperatures  -Blood cultures no growth to date  -Pulmonology on consult, appreciate further  recommendations     Hypertension  -Patient currently normotensive  -Holding antihypertensive agents at this time  - Monitor and add agents as needed    HLD  -Continue statin        Plan of care discussed with patient at bedside . Discussed management/test result(s) with Rn and Pulmonology consultant    Quality:  DVT Prophylaxis: Heparin  CODE status: Full  Estimated date of discharge: TBD  Discharge is dependent on: clinical stability    53 minutes spent discussing with other providers, examining patient, obtaining history, reviewing medical records, interpreting and communicating test results/imaging, ordering tests/medications, discussing plan of care and documenting information.      Dawit Anthony MD          This note was prepared using Dragon Medical voice recognition dictation software. As a result errors may occur. When identified these errors have been corrected. While every attempt is made to correct errors during dictation discrepancies may still exist

## 2024-09-05 NOTE — PLAN OF CARE
Pt a&o x4, room air, on tele, self care. No reports of pain. Patient still having low grade fevers. Plan is to continue IV fluids and IV antibiotics. Safety precautions maintained. Call light in reach.      Problem: Patient Centered Care  Goal: Patient preferences are identified and integrated in the patient's plan of care  Description: Interventions:  - What would you like us to know as we care for you?   - Provide timely, complete, and accurate information to patient/family  - Incorporate patient and family knowledge, values, beliefs, and cultural backgrounds into the planning and delivery of care  - Encourage patient/family to participate in care and decision-making at the level they choose  - Honor patient and family perspectives and choices  Outcome: Progressing     Problem: Patient/Family Goals  Goal: Patient/Family Long Term Goal  Description: Patient's Long Term Goal: to feel better    Interventions:  - Po abx, wean o2.   - See additional Care Plan goals for specific interventions  Outcome: Progressing  Goal: Patient/Family Short Term Goal  Description: Patient's Short Term Goal: to go home    Interventions:   - dc planning  - See additional Care Plan goals for specific interventions  Outcome: Progressing     Problem: PAIN - ADULT  Goal: Verbalizes/displays adequate comfort level or patient's stated pain goal  Description: INTERVENTIONS:  - Encourage pt to monitor pain and request assistance  - Assess pain using appropriate pain scale  - Administer analgesics based on type and severity of pain and evaluate response  - Implement non-pharmacological measures as appropriate and evaluate response  - Consider cultural and social influences on pain and pain management  - Manage/alleviate anxiety  - Utilize distraction and/or relaxation techniques  - Monitor for opioid side effects  - Notify MD/LIP if interventions unsuccessful or patient reports new pain  - Anticipate increased pain with activity and pre-medicate  as appropriate  Outcome: Progressing     Problem: RISK FOR INFECTION - ADULT  Goal: Absence of fever/infection during anticipated neutropenic period  Description: INTERVENTIONS  - Monitor WBC  - Administer growth factors as ordered  - Implement neutropenic guidelines  Outcome: Progressing     Problem: SAFETY ADULT - FALL  Goal: Free from fall injury  Description: INTERVENTIONS:  - Assess pt frequently for physical needs  - Identify cognitive and physical deficits and behaviors that affect risk of falls.  - [place] fall precautions as indicated by assessment.  - Educate pt/family on patient safety including physical limitations  - Instruct pt to call for assistance with activity based on assessment  - Modify environment to reduce risk of injury  - Provide assistive devices as appropriate  - Consider OT/PT consult to assist with strengthening/mobility  - Encourage toileting schedule  Outcome: Progressing     Problem: DISCHARGE PLANNING  Goal: Discharge to home or other facility with appropriate resources  Description: INTERVENTIONS:  - Identify barriers to discharge w/pt and caregiver  - Include patient/family/discharge partner in discharge planning  - Arrange for needed discharge resources and transportation as appropriate  - Identify discharge learning needs (meds, wound care, etc)  - Arrange for interpreters to assist at discharge as needed  - Consider post-discharge preferences of patient/family/discharge partner  - Complete POLST form as appropriate  - Assess patient's ability to be responsible for managing their own health  - Refer to Case Management Department for coordinating discharge planning if the patient needs post-hospital services based on physician/LIP order or complex needs related to functional status, cognitive ability or social support system  Outcome: Progressing     Problem: RESPIRATORY - ADULT  Goal: Achieves optimal ventilation and oxygenation  Description: INTERVENTIONS:  - Assess for  changes in respiratory status  - Assess for changes in mentation and behavior  - Position to facilitate oxygenation and minimize respiratory effort  - Oxygen supplementation based on oxygen saturation or ABGs  - Provide Smoking Cessation handout, if applicable  - Encourage broncho-pulmonary hygiene including cough, deep breathe, Incentive Spirometry  - Assess the need for suctioning and perform as needed  - Assess and instruct to report SOB or any respiratory difficulty  - Respiratory Therapy support as indicated  - Manage/alleviate anxiety  - Monitor for signs/symptoms of CO2 retention  Outcome: Progressing

## 2024-09-06 ENCOUNTER — TELEPHONE (OUTPATIENT)
Dept: FAMILY MEDICINE CLINIC | Facility: CLINIC | Age: 69
End: 2024-09-06

## 2024-09-06 VITALS
HEART RATE: 69 BPM | RESPIRATION RATE: 24 BRPM | HEIGHT: 64 IN | BODY MASS INDEX: 31.18 KG/M2 | SYSTOLIC BLOOD PRESSURE: 139 MMHG | DIASTOLIC BLOOD PRESSURE: 87 MMHG | WEIGHT: 182.63 LBS | TEMPERATURE: 98 F | OXYGEN SATURATION: 96 %

## 2024-09-06 LAB
ANION GAP SERPL CALC-SCNC: 8 MMOL/L (ref 0–18)
BASOPHILS # BLD AUTO: 0.02 X10(3) UL (ref 0–0.2)
BASOPHILS NFR BLD AUTO: 0.2 %
BUN BLD-MCNC: 10 MG/DL (ref 9–23)
BUN/CREAT SERPL: 11 (ref 10–20)
CALCIUM BLD-MCNC: 8.2 MG/DL (ref 8.7–10.4)
CHLORIDE SERPL-SCNC: 107 MMOL/L (ref 98–112)
CO2 SERPL-SCNC: 24 MMOL/L (ref 21–32)
CREAT BLD-MCNC: 0.91 MG/DL
DEPRECATED RDW RBC AUTO: 43.1 FL (ref 35.1–46.3)
EGFRCR SERPLBLD CKD-EPI 2021: 90 ML/MIN/1.73M2 (ref 60–?)
EOSINOPHIL # BLD AUTO: 0.19 X10(3) UL (ref 0–0.7)
EOSINOPHIL NFR BLD AUTO: 1.9 %
ERYTHROCYTE [DISTWIDTH] IN BLOOD BY AUTOMATED COUNT: 12.9 % (ref 11–15)
GLUCOSE BLD-MCNC: 114 MG/DL (ref 70–99)
HCT VFR BLD AUTO: 37.8 %
HGB BLD-MCNC: 12.7 G/DL
IMM GRANULOCYTES # BLD AUTO: 0.05 X10(3) UL (ref 0–1)
IMM GRANULOCYTES NFR BLD: 0.5 %
LYMPHOCYTES # BLD AUTO: 1.35 X10(3) UL (ref 1–4)
LYMPHOCYTES NFR BLD AUTO: 13.5 %
MCH RBC QN AUTO: 30.7 PG (ref 26–34)
MCHC RBC AUTO-ENTMCNC: 33.6 G/DL (ref 31–37)
MCV RBC AUTO: 91.3 FL
MONOCYTES # BLD AUTO: 1.37 X10(3) UL (ref 0.1–1)
MONOCYTES NFR BLD AUTO: 13.7 %
NEUTROPHILS # BLD AUTO: 6.99 X10 (3) UL (ref 1.5–7.7)
NEUTROPHILS # BLD AUTO: 6.99 X10(3) UL (ref 1.5–7.7)
NEUTROPHILS NFR BLD AUTO: 70.2 %
OSMOLALITY SERPL CALC.SUM OF ELEC: 288 MOSM/KG (ref 275–295)
PLATELET # BLD AUTO: 160 10(3)UL (ref 150–450)
POTASSIUM SERPL-SCNC: 3.7 MMOL/L (ref 3.5–5.1)
RBC # BLD AUTO: 4.14 X10(6)UL
SODIUM SERPL-SCNC: 139 MMOL/L (ref 136–145)
WBC # BLD AUTO: 10 X10(3) UL (ref 4–11)

## 2024-09-06 PROCEDURE — 99239 HOSP IP/OBS DSCHRG MGMT >30: CPT | Performed by: INTERNAL MEDICINE

## 2024-09-06 PROCEDURE — 99232 SBSQ HOSP IP/OBS MODERATE 35: CPT | Performed by: INTERNAL MEDICINE

## 2024-09-06 RX ORDER — BENZONATATE 200 MG/1
200 CAPSULE ORAL 3 TIMES DAILY PRN
Qty: 15 CAPSULE | Refills: 0 | Status: SHIPPED | OUTPATIENT
Start: 2024-09-06

## 2024-09-06 NOTE — PROGRESS NOTES
[facility]  part of [facility]     Progress Note    Jose Rafael Oscar Patient Status:  Inpatient    1955 MRN [id number]   Location [facility]5W Attending Dawit Anthony MD   Hosp Day # 3 PCP Colleen Weiler,        Subjective:   Patient admits to improvement in cough and chest congestion.  Fevers improved.    Objective:   Blood pressure 139/87, pulse 71, temperature 98.3 °F (36.8 °C), temperature source Oral, resp. rate 24, height 5' 4\" (1.626 m), weight 182 lb 9.6 oz (82.8 kg), SpO2 96%.  Intake/Output:   Last 3 shifts: I/O last 3 completed shifts:  In: 5318.3 [P.O.:1440; I.V.:3778.3; IV PIGGYBACK:100]  Out: 4700 [Urine:4700]   This shift: I/O this shift:  In: 1035 [P.O.:480; I.V.:555]  Out: 0      Vent Settings:      Hemodynamic parameters (last 24 hours):      Scheduled Meds:   Current Facility-Administered Medications   Medication Dose Route Frequency    cefTRIAXone (Rocephin) 2 g in sodium chloride 0.9% 100 mL IVPB-ADDV  2 g Intravenous Q24H    azithromycin (Zithromax) tab 500 mg  500 mg Oral Daily    guaiFENesin (Robitussin) 100 MG/5 ML oral liquid 200 mg  200 mg Oral Q4H PRN    benzonatate (Tessalon) cap 200 mg  200 mg Oral TID PRN    sodium chloride 0.9% infusion   Intravenous Continuous    heparin (Porcine) 5000 UNIT/ML injection 5,000 Units  5,000 Units Subcutaneous 2 times per day    acetaminophen (Tylenol Extra Strength) tab 500 mg  500 mg Oral Q4H PRN    atorvastatin (Lipitor) tab 10 mg  10 mg Oral Nightly       Continuous Infusions:    sodium chloride 100 mL/hr at 24 0743       Physical Exam  Constitutional: no acute distress  Eyes: PERRL  ENT: nares pateint  Neck: supple, no JVD  Cardio: RRR, S1 S2  Respiratory: clear to auscultation bilaterally, no wheezing, rales, rhonchi, crackles  GI: abdomen soft, non tender, active bowel sounds, no organomegaly  Extremities: no clubbing, cyanosis, edema  Neurologic: no gross motor deficits  Skin: warm,  dry      Results:     Lab Results   Component Value Date    WBC 10.0 09/06/2024    HGB 12.7 09/06/2024    HCT 37.8 09/06/2024    .0 09/06/2024    CREATSERUM 0.91 09/06/2024    BUN 10 09/06/2024     09/06/2024    K 3.7 09/06/2024     09/06/2024    CO2 24.0 09/06/2024     09/06/2024    CA 8.2 09/06/2024       No results found.          Assessment   1.  Left lower lobe pneumonia  2.  Fevers  3.  Hypertension  4.  Leukocytosis     Plan   -Patient presents with evidence of some cough fevers after returning from travel from [place] 1 week prior.  -Chest x-ray on presentation with left lower lobe infiltrate seen concerning for pneumonia  -Continue empiric antibiotic therapy at this time  -Blood cultures thus far with no growth to date  -Fevers improved over last 24 hours.  -Weaned off oxygen at this time  -DVT prophylaxis: Heparin  -Discussed with hospitalist likely discharge later today with completion of course of antibiotic therapy.  Follow-up in pulm clinic in 2 weeks time    Halie Dykes DO  Pulmonary Critical Care Medicine  [facility]

## 2024-09-06 NOTE — TELEPHONE ENCOUNTER
Patient was in the hospital for pneumonia and needs a follow up appointment within a week. First available is not until 9/20.  Please reply to pool: EM CC IM FM ALG RHE [[id number]]

## 2024-09-06 NOTE — DISCHARGE SUMMARY
[facility]  part of [facility]    Discharge Summary    Jose Rafael Oscar Patient Status:  Inpatient    1955 MRN [id number]   Location [facility] system5[facility] Attending Dawit Anthony MD   Hosp Day # 3 PCP Colleen Weiler, DO     Date of Admission: 9/3/2024     Date of Discharge: 24      Lace+ Score: 47  59-90 High Risk  29-58 Medium Risk  0-28   Low Risk.    TCM Follow-Up Recommendation:  LACE 29-58: Moderate Risk of readmission after discharge from the hospital.    DISCHARGE DX: Principal Problem:    Pneumonia of left lower lobe due to infectious organism  Active Problems:    Fever, unspecified fever cause    Sepsis, due to unspecified organism, unspecified whether acute organ dysfunction present (HCC)       The patient was seen and examined on day of discharge and this discharge summary is in conjunction with any daily progress note from day of discharge.    HPI per admitting physician: \"69-year-old gentleman with significant past medical history of hypertension, hyperlipidemia who presents to the emergency room for cough, chest pain and headache. Reports that symptoms started over the weekend. Recently returned from a trip to [place] last week. In the ED complete blood count notable for white blood cell count of 13.1. Metabolic panel overall unremarkable. Troponin negative. D-dimer negative. Lactic acid normal. SIRS criteria met with tachypnea, fever of 101.2 and white blood cell count of 13. Blood cultures taken. Chest x-ray revealed left lower lobe pneumonia. Started on Augmentin and azithromycin. Admitted for further treatment and evaluation. \"    Hospital Course:     Pneumonia of left lower lobe due to infectious organism    Sepsis, due to unspecified organism, unspecified whether acute organ dysfunction present (HCC)  -   Sepsis protocol ordered.  -Initially started on Augmentin and azithromycin.  -Patient with persistent fevers   -Antibiotics changed to IV  ceftriaxone.  -Improved  -No further fevers  -Blood cultures no growth to date  -Complete course of abx on dc  -Pulmonology on consult, rec followup as outpt     Hypertension  -Resume home regimen.       HLD  -Continue statin        Physical Exam:    Vitals:    09/05/24 1331 09/05/24 2027 09/06/24 0442 09/06/24 0741   BP: 123/77 147/80 150/86 139/87   BP Location: Right arm Right arm Right arm Right arm   Pulse: 78 75 76 71   Resp: 20 18 20 24   Temp: 100.1 °F (37.8 °C) 99.9 °F (37.7 °C) 98.2 °F (36.8 °C) 98.3 °F (36.8 °C)   TempSrc: Oral Oral Oral Oral   SpO2: 97% 95% 95% 96%   Weight:       Height:         Patient Weight for the past 72 hrs:   Weight   09/03/24 1858 190 lb 14.7 oz (86.6 kg)   09/03/24 2319 182 lb 9.6 oz (82.8 kg)   09/04/24 0008 182 lb 9.6 oz (82.8 kg)       Intake/Output Summary (Last 24 hours) at 9/6/2024 0955  Last data filed at 9/6/2024 0447  Gross per 24 hour   Intake 3600 ml   Output 2900 ml   Net 700 ml       GENERAL:  Awake and alert, in no acute distress.  HEART:  Regular rhythm, regular rate  LUNGS:  Air entry was minimally decreased  No increased work of breathing or wheezes   ABDOMEN: Soft and non-tender.    PSYCHIATRIC: Normal mood    CULTURE:   Hospital Encounter on 09/03/24   1. Blood Culture     Status: None (Preliminary result)    Collection Time: 09/03/24 10:17 PM    Specimen: Blood,peripheral   Result Value Ref Range    Blood Culture Result No Growth 2 Days N/A       IMAGING STUDIES: SOME MAY NEED FOLLOW UP WITH PCP   XR CHEST AP PORTABLE  (CPT=71045)    Result Date: 9/3/2024  CONCLUSION:  1. Left lower lobe pneumonia and or atelectasis. 2. Probable subsegmental atelectasis in right lower lobe.     Dictated by (CST): Juanpablo Hernandez MD on 9/03/2024 at 8:35 PM     Finalized by (CST): Juanpablo Hernandez MD on 9/03/2024 at 8:37 PM          XR RIBS WITH CHEST (3 VIEWS), LEFT  (CPT=71101)    Result Date: 8/25/2024  CONCLUSION:  1. Negative left rib series. 2. No acute cardiopulmonary  finding.    Dictated by (CST): Juanpablo Hernandez MD on 8/25/2024 at 9:38 AM     Finalized by (CST): Juanpablo Hernandez MD on 8/25/2024 at 9:39 AM           LABS :     Lab Results   Component Value Date    WBC 10.0 09/06/2024    HGB 12.7 (L) 09/06/2024    HCT 37.8 (L) 09/06/2024    .0 09/06/2024    CREATSERUM 0.91 09/06/2024    BUN 10 09/06/2024     09/06/2024    K 3.7 09/06/2024     09/06/2024    CO2 24.0 09/06/2024     (H) 09/06/2024    CA 8.2 (L) 09/06/2024    ALB 4.3 09/04/2024    ALKPHO 47 09/04/2024    BILT 1.0 09/04/2024    TP 7.2 09/04/2024    AST 14 09/04/2024    ALT 20 09/04/2024    TSH 2.764 04/16/2024    PSA 0.5 08/28/2018    DDIMER 0.55 09/03/2024    MG 1.9 08/27/2024    B12 245 09/18/2018       Recent Labs   Lab 09/04/24 0537 09/05/24  0458 09/06/24  0500   RBC 4.77 4.35 4.14   HGB 15.1 13.6 12.7*   HCT 42.5 40.0 37.8*   MCV 89.1 92.0 91.3   MCH 31.7 31.3 30.7   MCHC 35.5 34.0 33.6   RDW 12.9 13.0 12.9   NEPRELIM 10.16* 8.85* 6.99   WBC 12.9* 11.3* 10.0   .0 157.0 160.0     Recent Labs   Lab 09/03/24 1957 09/04/24  0537 09/05/24  0458 09/06/24  0500   * 131* 117* 114*   BUN 13 12 12 10   CREATSERUM 1.15 1.16 1.10 0.91   CA 9.9 9.0 8.5* 8.2*   ALB 5.1* 4.3  --   --     135* 137 139   K 4.0 4.1 4.2 3.7    102 106 107   CO2 29.0 26.0 26.0 24.0   ALKPHO 59 47  --   --    AST 18 14  --   --    ALT 26 20  --   --    BILT 0.7 1.0  --   --    TP 8.4* 7.2  --   --      No results found for: \"PT\", \"INR\"    Disposition: Discharge to Home    Condition at Discharge: Stable     Discharge Medications:      Discharge Medications        START taking these medications        Instructions Prescription details   amoxicillin clavulanate 875-125 MG Tabs  Commonly known as: Augmentin      Take 1 tablet by mouth 2 (two) times daily for 7 days.   Stop taking on: September 13, 2024  Quantity: 14 tablet  Refills: 0     benzonatate 200 MG Caps  Commonly known as: Tessalon      Take 1  capsule (200 mg total) by mouth 3 (three) times daily as needed for cough.   Quantity: 15 capsule  Refills: 0            CONTINUE taking these medications        Instructions Prescription details   aspirin 81 MG Tabs      Take 1 tablet (81 mg total) by mouth daily.   Refills: 0     hydroCHLOROthiazide 25 MG Tabs      Take 1 tablet (25 mg total) by mouth daily.   Quantity: 90 tablet  Refills: 3     Multi-Vitamin/Minerals Tabs      Take 1 tablet by mouth daily. Focus factor   Refills: 0     OMEGA 3 OR      Take by mouth.   Refills: 0     simvastatin 20 MG Tabs  Commonly known as: Zocor      Take 1 tablet (20 mg total) by mouth daily.   Quantity: 90 tablet  Refills: 3            STOP taking these medications      HYDROcodone-acetaminophen 5-325 MG Tabs  Commonly known as: Norco        ibuprofen 600 MG Tabs  Commonly known as: Motrin                  Where to Get Your Medications        These medications were sent to [id number] - [place], [place] - [address] [phone], [phone]  [address] [address]      Phone: [phone]   amoxicillin clavulanate 875-125 MG Tabs  benzonatate 200 MG Caps         Follow up Visits  [facility]  [address] [id number] [phone]  Schedule an appointment as soon as possible for a visit      Weiler, Colleen M, DO  [address]  [address]  [facility] [id number] [phone]    Schedule an appointment as soon as possible for a visit in 1 week(s)      Colleen Weiler, DO    Consultants         Provider   Role Specialty     Halie Dykes DO      Consulting Physician PULMONARY DISEASES              Other Discharge Instructions:       ----------------------------------------------------  34 MIN SPENT ON THIS DC   Dawit Anthony MD    9/6/2024

## 2024-09-06 NOTE — PLAN OF CARE
A&Ox4, afebrile overnight, self care, continued IV fluids, IV & PO ABX, possible discharge today after MD assessment. Pt calls appropriately, hourly rounding overnight,       Problem: Patient Centered Care  Goal: Patient preferences are identified and integrated in the patient's plan of care  Description: Interventions:  - What would you like us to know as we care for you?   - Provide timely, complete, and accurate information to patient/family  - Incorporate patient and family knowledge, values, beliefs, and cultural backgrounds into the planning and delivery of care  - Encourage patient/family to participate in care and decision-making at the level they choose  - Honor patient and family perspectives and choices  Outcome: Progressing     Problem: Patient/Family Goals  Goal: Patient/Family Long Term Goal  Description: Patient's Long Term Goal: to feel better    Interventions:  - Po abx, wean o2.   - See additional Care Plan goals for specific interventions  Outcome: Progressing  Goal: Patient/Family Short Term Goal  Description: Patient's Short Term Goal: to go home    Interventions:   - dc planning  - See additional Care Plan goals for specific interventions  Outcome: Progressing     Problem: PAIN - ADULT  Goal: Verbalizes/displays adequate comfort level or patient's stated pain goal  Description: INTERVENTIONS:  - Encourage pt to monitor pain and request assistance  - Assess pain using appropriate pain scale  - Administer analgesics based on type and severity of pain and evaluate response  - Implement non-pharmacological measures as appropriate and evaluate response  - Consider cultural and social influences on pain and pain management  - Manage/alleviate anxiety  - Utilize distraction and/or relaxation techniques  - Monitor for opioid side effects  - Notify MD/LIP if interventions unsuccessful or patient reports new pain  - Anticipate increased pain with activity and pre-medicate as appropriate  Outcome:  Progressing     Problem: RISK FOR INFECTION - ADULT  Goal: Absence of fever/infection during anticipated neutropenic period  Description: INTERVENTIONS  - Monitor WBC  - Administer growth factors as ordered  - Implement neutropenic guidelines  Outcome: Progressing     Problem: SAFETY ADULT - FALL  Goal: Free from fall injury  Description: INTERVENTIONS:  - Assess pt frequently for physical needs  - Identify cognitive and physical deficits and behaviors that affect risk of falls.  - [place] fall precautions as indicated by assessment.  - Educate pt/family on patient safety including physical limitations  - Instruct pt to call for assistance with activity based on assessment  - Modify environment to reduce risk of injury  - Provide assistive devices as appropriate  - Consider OT/PT consult to assist with strengthening/mobility  - Encourage toileting schedule  Outcome: Progressing     Problem: DISCHARGE PLANNING  Goal: Discharge to home or other facility with appropriate resources  Description: INTERVENTIONS:  - Identify barriers to discharge w/pt and caregiver  - Include patient/family/discharge partner in discharge planning  - Arrange for needed discharge resources and transportation as appropriate  - Identify discharge learning needs (meds, wound care, etc)  - Arrange for interpreters to assist at discharge as needed  - Consider post-discharge preferences of patient/family/discharge partner  - Complete POLST form as appropriate  - Assess patient's ability to be responsible for managing their own health  - Refer to Case Management Department for coordinating discharge planning if the patient needs post-hospital services based on physician/LIP order or complex needs related to functional status, cognitive ability or social support system  Outcome: Progressing     Problem: RESPIRATORY - ADULT  Goal: Achieves optimal ventilation and oxygenation  Description: INTERVENTIONS:  - Assess for changes in respiratory  status  - Assess for changes in mentation and behavior  - Position to facilitate oxygenation and minimize respiratory effort  - Oxygen supplementation based on oxygen saturation or ABGs  - Provide Smoking Cessation handout, if applicable  - Encourage broncho-pulmonary hygiene including cough, deep breathe, Incentive Spirometry  - Assess the need for suctioning and perform as needed  - Assess and instruct to report SOB or any respiratory difficulty  - Respiratory Therapy support as indicated  - Manage/alleviate anxiety  - Monitor for signs/symptoms of CO2 retention  Outcome: Progressing

## 2024-09-06 NOTE — PLAN OF CARE
Pt ao x 4, on RA, ambulates independently. Fever has resolved since last night. Pt stable for discharge later this evening. Pt has received and understood dc education.  Pt will go home with wife. Call light within reach. Safety precautions in place.     Problem: Patient Centered Care  Goal: Patient preferences are identified and integrated in the patient's plan of care  Description: Interventions:  - What would you like us to know as we care for you?   - Provide timely, complete, and accurate information to patient/family  - Incorporate patient and family knowledge, values, beliefs, and cultural backgrounds into the planning and delivery of care  - Encourage patient/family to participate in care and decision-making at the level they choose  - Honor patient and family perspectives and choices  Outcome: Completed     Problem: Patient/Family Goals  Goal: Patient/Family Long Term Goal  Description: Patient's Long Term Goal: to feel better    Interventions:  - Po abx, wean o2.   - See additional Care Plan goals for specific interventions  Outcome: Completed  Goal: Patient/Family Short Term Goal  Description: Patient's Short Term Goal: to go home    Interventions:   - dc planning  - See additional Care Plan goals for specific interventions  Outcome: Completed     Problem: PAIN - ADULT  Goal: Verbalizes/displays adequate comfort level or patient's stated pain goal  Description: INTERVENTIONS:  - Encourage pt to monitor pain and request assistance  - Assess pain using appropriate pain scale  - Administer analgesics based on type and severity of pain and evaluate response  - Implement non-pharmacological measures as appropriate and evaluate response  - Consider cultural and social influences on pain and pain management  - Manage/alleviate anxiety  - Utilize distraction and/or relaxation techniques  - Monitor for opioid side effects  - Notify MD/LIP if interventions unsuccessful or patient reports new pain  - Anticipate  increased pain with activity and pre-medicate as appropriate  Outcome: Completed     Problem: RISK FOR INFECTION - ADULT  Goal: Absence of fever/infection during anticipated neutropenic period  Description: INTERVENTIONS  - Monitor WBC  - Administer growth factors as ordered  - Implement neutropenic guidelines  Outcome: Completed     Problem: SAFETY ADULT - FALL  Goal: Free from fall injury  Description: INTERVENTIONS:  - Assess pt frequently for physical needs  - Identify cognitive and physical deficits and behaviors that affect risk of falls.  - [place] fall precautions as indicated by assessment.  - Educate pt/family on patient safety including physical limitations  - Instruct pt to call for assistance with activity based on assessment  - Modify environment to reduce risk of injury  - Provide assistive devices as appropriate  - Consider OT/PT consult to assist with strengthening/mobility  - Encourage toileting schedule  Outcome: Completed     Problem: DISCHARGE PLANNING  Goal: Discharge to home or other facility with appropriate resources  Description: INTERVENTIONS:  - Identify barriers to discharge w/pt and caregiver  - Include patient/family/discharge partner in discharge planning  - Arrange for needed discharge resources and transportation as appropriate  - Identify discharge learning needs (meds, wound care, etc)  - Arrange for interpreters to assist at discharge as needed  - Consider post-discharge preferences of patient/family/discharge partner  - Complete POLST form as appropriate  - Assess patient's ability to be responsible for managing their own health  - Refer to Case Management Department for coordinating discharge planning if the patient needs post-hospital services based on physician/LIP order or complex needs related to functional status, cognitive ability or social support system  Outcome: Completed     Problem: RESPIRATORY - ADULT  Goal: Achieves optimal ventilation and  oxygenation  Description: INTERVENTIONS:  - Assess for changes in respiratory status  - Assess for changes in mentation and behavior  - Position to facilitate oxygenation and minimize respiratory effort  - Oxygen supplementation based on oxygen saturation or ABGs  - Provide Smoking Cessation handout, if applicable  - Encourage broncho-pulmonary hygiene including cough, deep breathe, Incentive Spirometry  - Assess the need for suctioning and perform as needed  - Assess and instruct to report SOB or any respiratory difficulty  - Respiratory Therapy support as indicated  - Manage/alleviate anxiety  - Monitor for signs/symptoms of CO2 retention  Outcome: Completed

## 2024-09-09 ENCOUNTER — TELEPHONE (OUTPATIENT)
Dept: FAMILY MEDICINE CLINIC | Facility: CLINIC | Age: 69
End: 2024-09-09

## 2024-09-09 ENCOUNTER — PATIENT OUTREACH (OUTPATIENT)
Dept: CASE MANAGEMENT | Age: 69
End: 2024-09-09

## 2024-09-09 DIAGNOSIS — J18.9 PNEUMONIA OF LEFT LOWER LOBE DUE TO INFECTIOUS ORGANISM: Primary | ICD-10-CM

## 2024-09-09 PROCEDURE — 1111F DSCHRG MED/CURRENT MED MERGE: CPT

## 2024-09-09 NOTE — PROGRESS NOTES
Transitions of Care Navigation  Discharge Date: 24  Contact Date: 2024    DISCHARGE DX: Principal Problem:    Pneumonia of left lower lobe due to infectious organism  Active Problems:    Fever, unspecified fever cause    Sepsis, due to unspecified organism, unspecified whether acute organ dysfunction present (HCC)      Transitions of Care Assessment:  LULA Initial Assessment    General:  Assessment completed with: Patient  Patient Subjective: Pt feeling better, since hospital discharge--tolerating Augmentin, as prescribed, appetite good, staying hydrated, independent with ADLs. Pt denies fever, chills, shaking, productive cough, headache, vision changes, dizziness, nausea, vomiting, diarrhea, chest pain or shortness of breath at this time--speaking in full, clear sentences--occasional dry cough managed well with Benzonatate.  Chief Complaint: DISCHARGE DX: Principal Problem:    Pneumonia of left lower lobe due to infectious organism  Active Problems:    Fever, unspecified fever cause    Sepsis, due to unspecified organism, unspecified whether acute organ dysfunction present (HCC)  Verify patient name and  with patient/ caregiver: Yes    Hospital Stay/Discharge:  Tell me what you understand of why you were in the hospital or emergency department: Patient c/o chest congestion with cough and headache since  night. Denies sick contacts. +Body aches and chills, unsure of fevers.  Prior to leaving the hospital were your Discharge Instructions reviewed with you?: Yes  Did you receive a copy of your written Discharge Instructions?: Yes  What questions do you have about your Discharge Instructions?: none  Do you feel better or worse since you left the hospital or emergency department?: Better    Follow - Up Appointment:  Do you have a follow-up appointment?: Yes  Date: 24  Physician: Dr. Weiler  Are there any barriers to getting to your follow-up appointment?: No    Home Health/DME:  Prior to leaving  the hospital was Home Health (HH) arranged for you?: N/A  Are HH needs identified by staff during the assessment?: No     Prior to leaving the hospital or emergency department was Durable Medical Equipment (DME), medical supplies, or infusions arranged for you?: No  Are DME/medical supply/infusions needs identified by staff during this assessment?: Yes (was not given IS)     Medications/Diet:  Did any of your medications change, during or after your hospital stay or ED visit?: Yes  Do you have your new or updated medications?: Yes  Do you understand what your medications are for and possible side effects?: Yes  Are there any reasons that keep you from taking your medication as prescribed?: No  Any concerns about medication refills?: No    Were you given a different diet per your Discharge Instructions?: No  Reason: not required     Questions/Concerns:  Do you have any questions or concerns that have not been discussed?: No     LULA Follow-up Assessment    General:  Assessment completed with: Patient  Patient Subjective: Pt feeling better, since hospital discharge--tolerating Augmentin, as prescribed, appetite good, staying hydrated, independent with ADLs. Pt denies fever, chills, shaking, productive cough, headache, vision changes, dizziness, nausea, vomiting, diarrhea, chest pain or shortness of breath at this time--speaking in full, clear sentences--occasional dry cough managed well with Benzonatate.  Chief Complaint: DISCHARGE DX: Principal Problem:    Pneumonia of left lower lobe due to infectious organism  Active Problems:    Fever, unspecified fever cause    Sepsis, due to unspecified organism, unspecified whether acute organ dysfunction present (HCC)  Community Resources: Other (none)    Progress/Care Plan:  Is the patient progressing as planned?: Yes  Frequency/Follow Up Plan: pt declines further calls from [facility]     Nursing Interventions: Discussed diet, activity, medications and need for f/u visits--advised  pt to take 10 slow deep breaths 10 x/hr, while wake--patient verbalizes understanding and agreement. Pt declines SCC/PNA clinic appt. Offered sooner TCM appt with Dr. Weiler--pt declines--prefers to complete course of Augmentin before seeing PCP and keep 9/19/24 MyChart appt, as scheduled. This NCM unable to convert appt to TCM/LULA, as existing appt only 15 minutes in length. Sent TE to office staff as FYI/LULA protocol.  Pt also declines further calls from [facility]. Patient aware when to contact PCP/specialists and when to seek emergency care. No further questions/concerns at this time.    Medications:  Current Outpatient Medications   Medication Sig Dispense Refill    amoxicillin clavulanate 875-125 MG Oral Tab Take 1 tablet by mouth 2 (two) times daily for 7 days. 14 tablet 0    benzonatate 200 MG Oral Cap Take 1 capsule (200 mg total) by mouth 3 (three) times daily as needed for cough. 15 capsule 0    hydroCHLOROthiazide 25 MG Oral Tab Take 1 tablet (25 mg total) by mouth daily. 90 tablet 3    simvastatin 20 MG Oral Tab Take 1 tablet (20 mg total) by mouth daily. 90 tablet 3    Linolenic Acid (OMEGA 3 OR) Take by mouth.      Multiple Vitamins-Minerals (MULTI-VITAMIN/MINERALS) Oral Tab Take 1 tablet by mouth daily. Focus factor      aspirin 81 MG Oral Tab Take 1 tablet (81 mg total) by mouth daily.         Diagnosis specifics:  Pneumonia: Tell me what you understand about the signs and symptoms of pneumonia reoccurrence?  (Guide to discuss: fever, chills, shaking, chest pain, productive cough, difficulty breathing)  Comments: pt denies any of the above symptoms--will monitor and return to ER, if suddenly worsens.     Follow-up Appointments:      Start   Ordered   09/05/24 1319  Follow up for COPD/Pneumonia  (Post-Discharge Follow-Up Orders)  Once     Completed     Priority: Routine   Question Answer Comment   Patient to be seen for: Pneumonia    When should patient follow up? Next week    Where to follow-up for  COPD/Pneumonia? [facility]        09/05/24 2528     COPD/Pneumonia - Reason Appt Not Made Pt Declined - Prefer to see PCP  pt declined apt; pt prefers to see PCP  -[facility]         Follow-up Information    Follow up With Specialties Details Why Contact Info   Weiler, Colleen M, DO Family Medicine Schedule an appointment as soon as possible for a visit in 1 week(s) Your provider Dr Colleen Weiler stated that they will give you a call to schedule your Hospital follow-up appointment once you are home.  If you do not hear from them within a day or two, please call them to schedule your appointment to be within 7 days. [address]  [facility] [address]  [phone]     Your appointments       Date & Time Appointment Department ([place])    Sep 19, 2024 1:45 PM CDT Follow Up Visit with Weiler, Colleen M, DO [facility]    Contact your primary care provider if your insurance requires a referral.    Please arrive 15 minutes prior to your scheduled appointment. Be sure to bring your current Insurance card, Photo ID, and medication bottles or a list of your current medications.      A 24 hour notice is required to cancel any appointment or you may be charged a $40 No Show Fee.     Important: 24 hour notice is required to cancel any appointment or you may be charged a $40 No Show Fee. Please notify your physician office.               [facility]  [address]  [facility] [address]  [phone]            Transitional Care Clinic  Was TCC Ordered: No    Primary Care Provider (If no TCC appointment)  Does patient already have a PCP appointment scheduled? Yes  Nurse Care Manager Attempted to schedule sooner PCP office TCM/LULA appointment with patient   -If no appointment scheduled: Explain : pt declines sooner appt    Specialist  Does the patient have any  other follow-up appointment(s) need to be scheduled? Yes   -If yes: Nurse Care Manager reviewed upcoming specialist appointments with patient: Yes   -Does the patient need assistance scheduling appointment(s): No--pt declines SCC/PNA clinic appt--prefers to f/u with PCP only, at this time    []  Patient verbally agrees to additional follow-up calls from Nurse Care Manager--pt declines further calls from [facility]    Book By Date: 9/13/2024

## 2024-09-09 NOTE — TELEPHONE ENCOUNTER
Sent as FYI/LULA protocol:      Spoke to patient for Transitions of Care call today.  Pt declines SCC/PNA clinic appt. Offered sooner TCM appt with Dr. Weiler--pt declines--prefers to complete course of Augmentin before seeing PCP and keep 9/19/24 MyChart appt, as scheduled. This NCM unable to convert appt to TCM/LULA, as existing appt only 15 minutes in length.        TCM/LULA appointment needed by 9/13/24.      BOOK BY DATE: 9/20/24    Please discuss with PCP if 15 minute MyChart appt 9/19/24 can be converted to TCM/LULA appt. No need to f/u with pt, unless appt date/time needs to n=be changed--only appt type question. Thank you!           DISCHARGE DX: Principal Problem:    Pneumonia of left lower lobe due to infectious organism  Active Problems:    Fever, unspecified fever cause    Sepsis, due to unspecified organism, unspecified whether acute organ dysfunction present (HCC)      COPD/Pneumonia - Reason Appt Not Made Pt Declined - Prefer to see PCP  pt declined apt; pt prefers to see PCP  -       Follow-up Information    Follow up With Specialties Details Why Contact Info   Weiler, Colleen M, DO Family Medicine Schedule an appointment as soon as possible for a visit in 1 week(s) Your provider Dr Colleen Weiler stated that they will give you a call to schedule your Hospital follow-up appointment once you are home.  If you do not hear from them within a day or two, please call them to schedule your appointment to be within 7 days. [address] [place] [address]  [phone]     Appointment Information   Name: Jose Rafael Oscar MRN: [id number]   Date: 9/19/2024 Status: Ryan   Appt Time: 1:45 PM Length: 15   Visit Type: MYCHART FOLLOW UP [3209] Copay: $0.00   Provider: Weiler, Colleen M, DO Department: [facility]   Referral Number:   Referral Status:     Enc Form Number: [id number]       Notes: Post pneumonia hospitalization follow up   Made On: 9/6/2024 5:16 PM By: GENERIC, MYCHART [MYCHARTG]  (PtMobApp)

## 2024-09-10 NOTE — PAYOR COMM NOTE
--------------  DISCHARGE REVIEW    Payor: [employer] [place] [employer]  Subscriber #:  [id number]  Authorization Number: [id number]    Admit date: 9/3/24  Admit time:  11:09 PM  Discharge Date: 2024  3:39 PM     Admitting Physician: Yaneli Kumar MD  Attending Physician:  No att. providers found  Primary Care Physician: Weiler, Colleen M, DO          Discharge Summary Notes        Discharge Summary signed by Dawit Anthony MD at 2024  8:41 AM       Author: Dawit Anthony MD Specialty: HOSPITALIST, Internal Medicine Author Type: Physician    Filed: 2024  8:41 AM Date of Service: 2024  9:55 AM Status: Signed    : Dawit Anthony MD (Physician)         [facility]  [facility] of [facility]    Discharge Summary    Jose Rafael Oscar Patient Status:  Inpatient    1955 MRN [id number]   Location [facility]5W Attending Dawit Anthony MD   Hosp Day # 3 PCP Colleen Weiler, DO     Date of Admission: 9/3/2024     Date of Discharge: 24      Lace+ Score: 47  59-90 High Risk  29-58 Medium Risk  0-28   Low Risk.    TCM Follow-Up Recommendation:  LACE 29-58: Moderate Risk of readmission after discharge from the hospital.    DISCHARGE DX: Principal Problem:    Pneumonia of left lower lobe due to infectious organism  Active Problems:    Fever, unspecified fever cause    Sepsis, due to unspecified organism, unspecified whether acute organ dysfunction present (HCC)       The patient was seen and examined on day of discharge and this discharge summary is in conjunction with any daily progress note from day of discharge.    HPI per admitting physician: \"69-year-old gentleman with significant past medical history of hypertension, hyperlipidemia who presents to the emergency room for cough, chest pain and headache. Reports that symptoms started over the weekend. Recently returned from a trip to [place] last week. In the ED complete blood count notable for white blood cell  count of 13.1. Metabolic panel overall unremarkable. Troponin negative. D-dimer negative. Lactic acid normal. SIRS criteria met with tachypnea, fever of 101.2 and white blood cell count of 13. Blood cultures taken. Chest x-ray revealed left lower lobe pneumonia. Started on Augmentin and azithromycin. Admitted for further treatment and evaluation. \"    Hospital Course:     Pneumonia of left lower lobe due to infectious organism    Sepsis, due to unspecified organism, unspecified whether acute organ dysfunction present (HCC)  -   Sepsis protocol ordered.  -Initially started on Augmentin and azithromycin.  -Patient with persistent fevers   -Antibiotics changed to IV ceftriaxone.  -Improved  -No further fevers  -Blood cultures no growth to date  -Complete course of abx on dc  -Pulmonology on consult, rec followup as outpt     Hypertension  -Resume home regimen.       HLD  -Continue statin        Physical Exam:    Vitals:    09/05/24 1331 09/05/24 2027 09/06/24 0442 09/06/24 0741   BP: 123/77 147/80 150/86 139/87   BP Location: Right arm Right arm Right arm Right arm   Pulse: 78 75 76 71   Resp: 20 18 20 24   Temp: 100.1 °F (37.8 °C) 99.9 °F (37.7 °C) 98.2 °F (36.8 °C) 98.3 °F (36.8 °C)   TempSrc: Oral Oral Oral Oral   SpO2: 97% 95% 95% 96%   Weight:       Height:         Patient Weight for the past 72 hrs:   Weight   09/03/24 1858 190 lb 14.7 oz (86.6 kg)   09/03/24 2319 182 lb 9.6 oz (82.8 kg)   09/04/24 0008 182 lb 9.6 oz (82.8 kg)       Intake/Output Summary (Last 24 hours) at 9/6/2024 0955  Last data filed at 9/6/2024 0447  Gross per 24 hour   Intake 3600 ml   Output 2900 ml   Net 700 ml       GENERAL:  Awake and alert, in no acute distress.  HEART:  Regular rhythm, regular rate  LUNGS:  Air entry was minimally decreased  No increased work of breathing or wheezes   ABDOMEN: Soft and non-tender.    PSYCHIATRIC: Normal mood    CULTURE:   Hospital Encounter on 09/03/24   1. Blood Culture     Status: None (Preliminary  result)    Collection Time: 09/03/24 10:17 PM    Specimen: Blood,peripheral   Result Value Ref Range    Blood Culture Result No Growth 2 Days N/A       IMAGING STUDIES: SOME MAY NEED FOLLOW UP WITH PCP   XR CHEST AP PORTABLE  (CPT=71045)    Result Date: 9/3/2024  CONCLUSION:  1. Left lower lobe pneumonia and or atelectasis. 2. Probable subsegmental atelectasis in right lower lobe.     Dictated by (CST): Juanpablo Hernandez MD on 9/03/2024 at 8:35 PM     Finalized by (CST): Juanpablo Hernandez MD on 9/03/2024 at 8:37 PM          XR RIBS WITH CHEST (3 VIEWS), LEFT  (CPT=71101)    Result Date: 8/25/2024  CONCLUSION:  1. Negative left rib series. 2. No acute cardiopulmonary finding.    Dictated by (CST): Juanpablo Hernandez MD on 8/25/2024 at 9:38 AM     Finalized by (CST): Juanpablo Hernandez MD on 8/25/2024 at 9:39 AM           LABS :     Lab Results   Component Value Date    WBC 10.0 09/06/2024    HGB 12.7 (L) 09/06/2024    HCT 37.8 (L) 09/06/2024    .0 09/06/2024    CREATSERUM 0.91 09/06/2024    BUN 10 09/06/2024     09/06/2024    K 3.7 09/06/2024     09/06/2024    CO2 24.0 09/06/2024     (H) 09/06/2024    CA 8.2 (L) 09/06/2024    ALB 4.3 09/04/2024    ALKPHO 47 09/04/2024    BILT 1.0 09/04/2024    TP 7.2 09/04/2024    AST 14 09/04/2024    ALT 20 09/04/2024    TSH 2.764 04/16/2024    PSA 0.5 08/28/2018    DDIMER 0.55 09/03/2024    MG 1.9 08/27/2024    B12 245 09/18/2018       Recent Labs   Lab 09/04/24  0537 09/05/24  0458 09/06/24  0500   RBC 4.77 4.35 4.14   HGB 15.1 13.6 12.7*   HCT 42.5 40.0 37.8*   MCV 89.1 92.0 91.3   MCH 31.7 31.3 30.7   MCHC 35.5 34.0 33.6   RDW 12.9 13.0 12.9   NEPRELIM 10.16* 8.85* 6.99   WBC 12.9* 11.3* 10.0   .0 157.0 160.0     Recent Labs   Lab 09/03/24  1957 09/04/24  0537 09/05/24  0458 09/06/24  0500   * 131* 117* 114*   BUN 13 12 12 10   CREATSERUM 1.15 1.16 1.10 0.91   CA 9.9 9.0 8.5* 8.2*   ALB 5.1* 4.3  --   --     135* 137 139   K 4.0 4.1 4.2 3.7   CL  101 102 106 107   CO2 29.0 26.0 26.0 24.0   ALKPHO 59 47  --   --    AST 18 14  --   --    ALT 26 20  --   --    BILT 0.7 1.0  --   --    TP 8.4* 7.2  --   --      No results found for: \"PT\", \"INR\"    Disposition: Discharge to Home    Condition at Discharge: Stable     Discharge Medications:      Discharge Medications        START taking these medications        Instructions Prescription details   amoxicillin clavulanate 875-125 MG Tabs  Commonly known as: Augmentin      Take 1 tablet by mouth 2 (two) times daily for 7 days.   Stop taking on: September 13, 2024  Quantity: 14 tablet  Refills: 0     benzonatate 200 MG Caps  Commonly known as: Tessalon      Take 1 capsule (200 mg total) by mouth 3 (three) times daily as needed for cough.   Quantity: 15 capsule  Refills: 0            CONTINUE taking these medications        Instructions Prescription details   aspirin 81 MG Tabs      Take 1 tablet (81 mg total) by mouth daily.   Refills: 0     hydroCHLOROthiazide 25 MG Tabs      Take 1 tablet (25 mg total) by mouth daily.   Quantity: 90 tablet  Refills: 3     Multi-Vitamin/Minerals Tabs      Take 1 tablet by mouth daily. Focus factor   Refills: 0     OMEGA 3 OR      Take by mouth.   Refills: 0     simvastatin 20 MG Tabs  Commonly known as: Zocor      Take 1 tablet (20 mg total) by mouth daily.   Quantity: 90 tablet  Refills: 3            STOP taking these medications      HYDROcodone-acetaminophen 5-325 MG Tabs  Commonly known as: Norco        ibuprofen 600 MG Tabs  Commonly known as: Motrin                  Where to Get Your Medications        These medications were sent to [id number] - [place], [place] - [address] [phone], [phone]  [address] [facility] [address]      Phone: [phone]   amoxicillin clavulanate 875-125 MG Tabs  benzonatate 200 MG Caps         Follow up Visits  [facility]  [address] [place] [address]  [address]  [phone]  Schedule an appointment as soon as possible for a visit      Weiler, Colleen M, DO  [address] [place] [address]  [phone]    Schedule an appointment as soon as possible for a visit in 1 week(s)      Colleen Weiler, DO    Consultants         Provider   Role Specialty     Halie Dykes DO      Consulting Physician PULMONARY DISEASES              Other Discharge Instructions:       ----------------------------------------------------  34 MIN SPENT ON THIS DC   Dawit Anthony MD    9/6/2024      Electronically signed by Dawit Anthony MD on 9/7/2024  8:41 AM         REVIEWER COMMENTS

## 2024-09-19 ENCOUNTER — OFFICE VISIT (OUTPATIENT)
Dept: FAMILY MEDICINE CLINIC | Facility: CLINIC | Age: 69
End: 2024-09-19

## 2024-09-19 VITALS
WEIGHT: 185 LBS | DIASTOLIC BLOOD PRESSURE: 80 MMHG | TEMPERATURE: 98 F | RESPIRATION RATE: 18 BRPM | BODY MASS INDEX: 32 KG/M2 | SYSTOLIC BLOOD PRESSURE: 118 MMHG | HEART RATE: 70 BPM | OXYGEN SATURATION: 96 %

## 2024-09-19 DIAGNOSIS — J18.9 PNEUMONIA OF LEFT LOWER LOBE DUE TO INFECTIOUS ORGANISM: Primary | ICD-10-CM

## 2024-09-19 PROCEDURE — 3079F DIAST BP 80-89 MM HG: CPT | Performed by: FAMILY MEDICINE

## 2024-09-19 PROCEDURE — 1111F DSCHRG MED/CURRENT MED MERGE: CPT | Performed by: FAMILY MEDICINE

## 2024-09-19 PROCEDURE — 3074F SYST BP LT 130 MM HG: CPT | Performed by: FAMILY MEDICINE

## 2024-09-19 PROCEDURE — 99213 OFFICE O/P EST LOW 20 MIN: CPT | Performed by: FAMILY MEDICINE

## 2024-09-19 PROCEDURE — 1160F RVW MEDS BY RX/DR IN RCRD: CPT | Performed by: FAMILY MEDICINE

## 2024-09-19 PROCEDURE — 1159F MED LIST DOCD IN RCRD: CPT | Performed by: FAMILY MEDICINE

## 2024-09-19 RX ORDER — BENZONATATE 200 MG/1
200 CAPSULE ORAL 3 TIMES DAILY PRN
Qty: 30 CAPSULE | Refills: 1 | Status: SHIPPED | OUTPATIENT
Start: 2024-09-19

## 2024-09-19 NOTE — PROGRESS NOTES
HPI: Jose Rafael is a 69 year old male who presents for follow-up pneumonia. Started with cough.  Went to ER on 9/3.  Diagnosed with LLL pneumonia. Started antibiotics and Benzonatate. He states he is now feeling better.  No fever.  Has been doing work outside. No shortness of breath. Chest discomfort has resolved.     PMH:    Past Medical History:    Colon polyp    Pathology came back negative    Colon polyps    polyps, colon    Cyst of neck    3/22/17 Right Neck Cyst    Essential hypertension    Taking Hydrochlorothiazide 12.5 mg    High blood pressure    High cholesterol    Hyperlipidemia    Torn rotator cuff    rotator cuff tear (right).  2011 Rotator cuff repair      Alg:  Patient has no known allergies.   Meds:   Current Outpatient Medications on File Prior to Visit   Medication Sig Dispense Refill    benzonatate 200 MG Oral Cap Take 1 capsule (200 mg total) by mouth 3 (three) times daily as needed for cough. 15 capsule 0    hydroCHLOROthiazide 25 MG Oral Tab Take 1 tablet (25 mg total) by mouth daily. 90 tablet 3    simvastatin 20 MG Oral Tab Take 1 tablet (20 mg total) by mouth daily. 90 tablet 3    Linolenic Acid (OMEGA 3 OR) Take by mouth.      Multiple Vitamins-Minerals (MULTI-VITAMIN/MINERALS) Oral Tab Take 1 tablet by mouth daily. Focus factor      aspirin 81 MG Oral Tab Take 1 tablet (81 mg total) by mouth daily.       No current facility-administered medications on file prior to visit.      Tobacco Use: no    Objective:   Gen: AOx3. NAD.   /85   Pulse 70   Temp 97.8 °F (36.6 °C) (Temporal)   Resp 18   Wt 185 lb (83.9 kg)   SpO2 96%   BMI 31.76 kg/m²   CV:  Regular rate and rhythm; no murmurs  Lungs:  Clear to ausculation; good aeration               No wheezes, rales or rhonchi      Assessment:/Plan:  Encounter Diagnosis   Name Primary?    Pneumonia of left lower lobe due to infectious organism Yes        Symptoms improved.  Breathing is better.  Cough has improved.  Benzonatate refilled.      Colleen Weiler, DO

## 2024-11-06 ENCOUNTER — PATIENT MESSAGE (OUTPATIENT)
Dept: FAMILY MEDICINE CLINIC | Facility: CLINIC | Age: 69
End: 2024-11-06

## 2024-11-07 NOTE — TELEPHONE ENCOUNTER
Patient reported vitals: 137/86  [employer] message sent to patient to continue to monitor blood pressure.

## 2025-02-28 RX ORDER — SIMVASTATIN 20 MG
20 TABLET ORAL DAILY
Qty: 90 TABLET | Refills: 3 | Status: SHIPPED | OUTPATIENT
Start: 2025-02-28

## 2025-02-28 NOTE — TELEPHONE ENCOUNTER
Patient calling to follow up on medication, stated left it behind when traveling and so is completley out.

## 2025-04-08 ENCOUNTER — LAB ENCOUNTER (OUTPATIENT)
Dept: LAB | Age: 70
End: 2025-04-08
Attending: FAMILY MEDICINE
Payer: MEDICARE

## 2025-04-08 ENCOUNTER — OFFICE VISIT (OUTPATIENT)
Dept: FAMILY MEDICINE CLINIC | Facility: CLINIC | Age: 70
End: 2025-04-08

## 2025-04-08 VITALS
HEART RATE: 69 BPM | SYSTOLIC BLOOD PRESSURE: 130 MMHG | BODY MASS INDEX: 31.01 KG/M2 | WEIGHT: 172.81 LBS | DIASTOLIC BLOOD PRESSURE: 78 MMHG | TEMPERATURE: 98 F | RESPIRATION RATE: 16 BRPM | HEIGHT: 62.6 IN

## 2025-04-08 DIAGNOSIS — Z59.10 INADEQUATE HOUSING, UNSPECIFIED: ICD-10-CM

## 2025-04-08 DIAGNOSIS — Z00.00 ENCOUNTER FOR ANNUAL HEALTH EXAMINATION: Primary | ICD-10-CM

## 2025-04-08 DIAGNOSIS — I10 ESSENTIAL HYPERTENSION: ICD-10-CM

## 2025-04-08 DIAGNOSIS — E78.5 HYPERLIPIDEMIA, UNSPECIFIED HYPERLIPIDEMIA TYPE: ICD-10-CM

## 2025-04-08 DIAGNOSIS — R39.12 WEAK URINE STREAM: ICD-10-CM

## 2025-04-08 DIAGNOSIS — Z12.5 ENCOUNTER FOR SCREENING FOR MALIGNANT NEOPLASM OF PROSTATE: ICD-10-CM

## 2025-04-08 DIAGNOSIS — Z86.0100 HISTORY OF COLON POLYPS: ICD-10-CM

## 2025-04-08 DIAGNOSIS — Z00.00 ENCOUNTER FOR ANNUAL HEALTH EXAMINATION: ICD-10-CM

## 2025-04-08 PROBLEM — A41.9 SEPSIS, DUE TO UNSPECIFIED ORGANISM, UNSPECIFIED WHETHER ACUTE ORGAN DYSFUNCTION PRESENT (HCC): Status: RESOLVED | Noted: 2024-09-03 | Resolved: 2025-04-08

## 2025-04-08 PROBLEM — R50.9 FEVER, UNSPECIFIED FEVER CAUSE: Status: RESOLVED | Noted: 2024-09-03 | Resolved: 2025-04-08

## 2025-04-08 PROBLEM — J18.9 PNEUMONIA OF LEFT LOWER LOBE DUE TO INFECTIOUS ORGANISM: Status: RESOLVED | Noted: 2024-09-03 | Resolved: 2025-04-08

## 2025-04-08 LAB
ALBUMIN SERPL-MCNC: 4.5 G/DL (ref 3.2–4.8)
ALBUMIN/GLOB SERPL: 1.7 {RATIO} (ref 1–2)
ALP LIVER SERPL-CCNC: 40 U/L
ALT SERPL-CCNC: 21 U/L
ANION GAP SERPL CALC-SCNC: 10 MMOL/L (ref 0–18)
AST SERPL-CCNC: 24 U/L (ref ?–34)
BILIRUB SERPL-MCNC: 1 MG/DL (ref 0.2–1.1)
BUN BLD-MCNC: 18 MG/DL (ref 9–23)
BUN/CREAT SERPL: 16.4 (ref 10–20)
CALCIUM BLD-MCNC: 9.4 MG/DL (ref 8.7–10.4)
CHLORIDE SERPL-SCNC: 101 MMOL/L (ref 98–112)
CHOLEST SERPL-MCNC: 157 MG/DL (ref ?–200)
CO2 SERPL-SCNC: 28 MMOL/L (ref 21–32)
CREAT BLD-MCNC: 1.1 MG/DL
DEPRECATED RDW RBC AUTO: 45.1 FL (ref 35.1–46.3)
EGFRCR SERPLBLD CKD-EPI 2021: 72 ML/MIN/1.73M2 (ref 60–?)
ERYTHROCYTE [DISTWIDTH] IN BLOOD BY AUTOMATED COUNT: 13.2 % (ref 11–15)
FASTING PATIENT LIPID ANSWER: YES
FASTING STATUS PATIENT QL REPORTED: YES
GLOBULIN PLAS-MCNC: 2.6 G/DL (ref 2–3.5)
GLUCOSE BLD-MCNC: 94 MG/DL (ref 70–99)
HCT VFR BLD AUTO: 46.2 %
HDLC SERPL-MCNC: 56 MG/DL (ref 40–59)
HGB BLD-MCNC: 15.6 G/DL
LDLC SERPL CALC-MCNC: 83 MG/DL (ref ?–100)
MCH RBC QN AUTO: 31.1 PG (ref 26–34)
MCHC RBC AUTO-ENTMCNC: 33.8 G/DL (ref 31–37)
MCV RBC AUTO: 92 FL
NONHDLC SERPL-MCNC: 101 MG/DL (ref ?–130)
OSMOLALITY SERPL CALC.SUM OF ELEC: 290 MOSM/KG (ref 275–295)
PLATELET # BLD AUTO: 185 10(3)UL (ref 150–450)
POTASSIUM SERPL-SCNC: 3.5 MMOL/L (ref 3.5–5.1)
PROT SERPL-MCNC: 7.1 G/DL (ref 5.7–8.2)
RBC # BLD AUTO: 5.02 X10(6)UL
SODIUM SERPL-SCNC: 139 MMOL/L (ref 136–145)
TRIGL SERPL-MCNC: 101 MG/DL (ref 30–149)
TSI SER-ACNC: 3.05 UIU/ML (ref 0.55–4.78)
VLDLC SERPL CALC-MCNC: 16 MG/DL (ref 0–30)
WBC # BLD AUTO: 6.6 X10(3) UL (ref 4–11)

## 2025-04-08 PROCEDURE — 80061 LIPID PANEL: CPT | Performed by: FAMILY MEDICINE

## 2025-04-08 PROCEDURE — 1159F MED LIST DOCD IN RCRD: CPT | Performed by: FAMILY MEDICINE

## 2025-04-08 PROCEDURE — 36415 COLL VENOUS BLD VENIPUNCTURE: CPT

## 2025-04-08 PROCEDURE — 3075F SYST BP GE 130 - 139MM HG: CPT | Performed by: FAMILY MEDICINE

## 2025-04-08 PROCEDURE — 96160 PT-FOCUSED HLTH RISK ASSMT: CPT | Performed by: FAMILY MEDICINE

## 2025-04-08 PROCEDURE — G0439 PPPS, SUBSEQ VISIT: HCPCS | Performed by: FAMILY MEDICINE

## 2025-04-08 PROCEDURE — 3008F BODY MASS INDEX DOCD: CPT | Performed by: FAMILY MEDICINE

## 2025-04-08 PROCEDURE — 84443 ASSAY THYROID STIM HORMONE: CPT

## 2025-04-08 PROCEDURE — 85027 COMPLETE CBC AUTOMATED: CPT

## 2025-04-08 PROCEDURE — 80053 COMPREHEN METABOLIC PANEL: CPT

## 2025-04-08 PROCEDURE — 3078F DIAST BP <80 MM HG: CPT | Performed by: FAMILY MEDICINE

## 2025-04-08 PROCEDURE — 1160F RVW MEDS BY RX/DR IN RCRD: CPT | Performed by: FAMILY MEDICINE

## 2025-04-08 PROCEDURE — 1126F AMNT PAIN NOTED NONE PRSNT: CPT | Performed by: FAMILY MEDICINE

## 2025-04-08 SDOH — ECONOMIC STABILITY - HOUSING INSECURITY: INADEQUATE HOUSING UNSPECIFIED: Z59.10

## 2025-04-08 NOTE — PROGRESS NOTES
Subjective:   Jose Rafael Oscar is a 69 year old male who presents for a MA AHA (Medicare Advantage Annual Health Assessment) and Medicare Subsequent Annual Wellness visit (Pt already had Initial Annual Wellness) and scheduled follow up of multiple significant but stable problems.        69 yr old male who presents for Medicare physical.  with 2 daughters. Has 2 grandsons in [place]. Getting .  Mom is 98 and living in [place]. Works in [employer]. Goes to gym daily. Eating healthy. Lost some weight.     Feeling some depression due to current divorce.     Wears glasses. Normal hearing.     Colonoscopy is up to date.  Due in .     Denies chest pain, SOB, palpitations, edema.      Has rectal itching.  Has spots of blood as well. Worse with spicy foods.     Urinary stream can be weaker.  Wakes up about once per night.   History/Other:   Fall Risk Assessment:   He has been screened for Falls and is low risk.      Cognitive Assessment:   Abnormal  What day of the week is this?: Correct  What month is it?: Correct  What year is it?: Correct  Recall \"Ball\": Correct  Recall \"Flag\": Incorrect  Recall \"Tree\": Correct    Functional Ability/Status:   Jose Rafael Oscar has a completely normal functional assessment. See flowsheet for details.        Depression Screening (PHQ):  PHQ-9 TOTAL SCORE: 13  , done 2025   Little interest or pleasure in doing things: 2    Feeling down, depressed, or hopeless: 2    Trouble falling or staying asleep, or sleeping too much: 2     Poor appetite or overeatin    Feeling bad about yourself - or that you are a failure or have let yourself or your family down: 2    Trouble concentrating on things, such as reading the newspaper or watching television: 2    Moving or speaking so slowly that other people could have noticed. Or the opposite - being so fidgety or restless that you have been moving around a lot more than usual: 2    If you checked off any problems, how  difficult have these problems made it for you to do your work, take care of things at home, or get along with other people?: Not difficult at all    Last [place] Suicide Screening on 4/8/2025 was No Risk.          Advanced Directives:   He does NOT have a Living Will. [Do you have a living will?: (Patient-Rptd) No]  He does NOT have a Power of  for Health Care. [Do you have a healthcare power of ?: (Patient-Rptd) No]  Discussed Advance Care Planning with patient (and family/surrogate if present). Standard forms made available to patient in After Visit Summary.      Patient Active Problem List   Diagnosis    Hyperlipidemia    History of colon polyps    Essential hypertension     Allergies:  He has No Known Allergies.    Current Medications:  Outpatient Medications Marked as Taking for the 4/8/25 encounter (Office Visit) with Weiler, Colleen M, DO   Medication Sig    simvastatin 20 MG Oral Tab Take 1 tablet (20 mg total) by mouth daily.    hydroCHLOROthiazide 25 MG Oral Tab Take 1 tablet (25 mg total) by mouth daily.    Linolenic Acid (OMEGA 3 OR) Take by mouth.    Multiple Vitamins-Minerals (MULTI-VITAMIN/MINERALS) Oral Tab Take 1 tablet by mouth daily. Focus factor    aspirin 81 MG Oral Tab Take 1 tablet (81 mg total) by mouth daily.       Medical History:  He  has a past medical history of Colon polyp (2003), Colon polyps, Cyst of neck, Essential hypertension (2018), High blood pressure, High cholesterol, Hyperlipidemia, and Torn rotator cuff (1999).  Surgical History:  He  has a past surgical history that includes tonsillectomy (1971); repair rotator cuff,chronic (Right, 2011); exc skin benig 2.1-3cm remaindr body (Right, 3/22/17); repr cmpl wnd head,fac,hand 2.6-7.5 (Right, 3/22/17); colonoscopy (2003 then every 5 years); colonoscopy (N/A, 6/24/2019); colonoscopy; and colonoscopy (N/A, 1/3/2020).   Family History:  His family history includes Cancer in his paternal grandmother; Colon Cancer (age  of onset: 61) in his cousin; Heart Disease (age of onset: 65) in his mother; Lipids in an other family member; Rash in his sister; brain aneurysm (age of onset: 47) in his father.  Social History:  He  reports that he has never smoked. He has never been exposed to tobacco smoke. He has never used smokeless tobacco. He reports current alcohol use. He reports that he does not use drugs.    Tobacco:  He has never smoked tobacco.    CAGE Alcohol Screen:   CAGE screening score of 0 on 4/7/2025, showing low risk of alcohol abuse.      Patient Care Team:  Weiler, Colleen M, DO as PCP - General (Family Medicine)  Juvencio Gaviria MD (NEUROLOGY)    Review of Systems  ROS:   Allergic/Immuno:  Negative for environmental allergies and food allergies  Cardiovascular:  Negative for chest pain and irregular heartbeat/palpitations  Constitutional:  Negative for decreased activity, fever, irritability and lethargy  Endocrine:  Negative for abnormal sleep patterns, increased activity, polydipsia and polyphagia  ENMT:  Negative for ear drainage, hearing loss and nasal drainage  Eyes:  Negative for eye discharge and vision loss  Gastrointestinal:  Negative for abdominal pain, constipation, decreased appetite, diarrhea and vomiting  Genitourinary:  Negative for dysuria and hematuria  Hema/Lymph:  Negative for easy bleeding and easy bruising  Integumentary:  Negative for pruritus and rash  Musculoskeletal:  Negative for bone/joint symptoms  Neurological:  Negative for gait disturbance  Psychiatric:  Negative for inappropriate interaction and psychiatric symptoms      Objective:   Physical Exam       /78   Pulse 69   Temp 97.6 °F (36.4 °C) (Temporal)   Resp 16   Ht 5' 2.6\" (1.59 m)   Wt 172 lb 12.8 oz (78.4 kg)   BMI 31.00 kg/m²  Estimated body mass index is 31 kg/m² as calculated from the following:    Height as of this encounter: 5' 2.6\" (1.59 m).    Weight as of this encounter: 172 lb 12.8 oz (78.4 kg).    Medicare  Hearing Assessment:   Hearing Screening    Screening Method: Questionnaire  I have a problem hearing over the telephone: No I have trouble following the conversations when two or more people are talking at the same time: No   I have trouble understanding things on the TV: No I have to strain to understand conversations: No   I have to worry about missing the telephone ring or doorbell: No I have trouble hearing conversations in a noisy background such as a crowded room or restaurant: No   I get confused about where sounds come from: No I misunderstand some words in a sentence and need to ask people to repeat themselves: No   I especially have trouble understanding the speech of women and children: No I have trouble understanding the speaker in a large room such as at a meeting or place of [employer]: No   Many people I talk to seem to mumble (or don't speak clearly): No People get annoyed because I misunderstand what they say: No   I misunderstand what others are saying and make inappropriate responses: No I avoid social activities because I cannot hear well and fear I will reply improperly: No   Family members and friends have told me they think I may have hearing loss: No             Visual Acuity:   Right Eye Visual Acuity: Corrected Right Eye Chart Acuity: 20/25   Left Eye Visual Acuity: Corrected Left Eye Chart Acuity: 20/25   Both Eyes Visual Acuity: Corrected Both Eyes Chart Acuity: 20/25   Able To Tolerate Visual Acuity: Yes    Gen:  Well-nourished.  No distress.  HEENT: Conjunctive clear.  Juan ear canals clear.  Juan TMs intact with good landmarks noted.  Nares patent.  Oral mucous membrane moist.  Normal lips, teeth, and gums.  Oropharynx normal.  Neck supple.  Good ROM.  No LAD.  Thyroid normal.  CV:  Regular rate and rhythm; no murmurs  Lungs:  Clear to ausculation; good aeration               No wheezes, rales or rhonchi  Abd: soft, non-tender, non-distended          Normal bowel sounds; no masses           No hepatosplenomegaly  Extremities: No cyanosis, clubbing, edema.  Pedal pulses 2+ venus.  MSK:  No abnormalities.  Skin: Warm/dry/intact.  No abnormal moles/lesions noted.  No rashes.        Assessment & Plan:   Jose Rafael Oscar is a 69 year old male who presents for a Medicare Assessment.     1. Encounter for annual health examination (Primary)    Healthy.  Exercising regularly. Labs done.   -     CBC, Platelet; No Differential; Future; Expected date: 04/08/2025  -     Comp Metabolic Panel (14); Future; Expected date: 04/08/2025  -     Lipid Panel  -     Assay, Thyroid Stim Hormone; Future; Expected date: 04/08/2025    2. Essential hypertension    Controlled.  CPM    3. Hyperlipidemia, unspecified hyperlipidemia type    On statin.  Check lipid panel.     4. Weak urine stream    Has been weaker.  Check PSA.  -     PSA Total, Screen; Future; Expected date: 04/08/2025    5. Encounter for screening for malignant neoplasm of prostate    Check PSA.  -     PSA Total, Screen; Future; Expected date: 04/08/2025    6. History of colon polyps    Up to date with colonoscopy.     The patient indicates understanding of these issues and agrees to the plan.  Reinforced healthy diet, lifestyle, and exercise.      Return in 6 months (on 10/8/2025).     Colleen Weiler, DO, 4/8/2025     Supplementary Documentation:   General Health:  In the past six months, have you lost more than 10 pounds without trying?: (Patient-Rptd) 2 - No  Has your appetite been poor?: (Patient-Rptd) No  Type of Diet: (Patient-Rptd) Balanced  How does the patient maintain a good energy level?: (Patient-Rptd) Appropriate Exercise  How would you describe your daily physical activity?: (Patient-Rptd) Moderate  How would you describe your current health state?: (Patient-Rptd) Good  How do you maintain positive mental well-being?: (Patient-Rptd) Social Interaction  On a scale of 0 to 10, with 0 being no pain and 10 being severe pain, what is your pain level?:  (Patient-Rptd) 0 - (None)  In the past six months, have you experienced urine leakage?: (Patient-Rptd) 0-No  At any time do you feel concerned for the safety/well-being of yourself and/or your children, in your home or elsewhere?: (Patient-Rptd) No  Have you had any immunizations at another office such as Influenza, Hepatitis B, Tetanus, or Pneumococcal?: (Patient-Rptd) Yes    Health Maintenance   Topic Date Due    Annual Well Visit  01/01/2025    COVID-19 Vaccine (8 - 2024-25 season) 03/19/2025    PSA  04/16/2026    Colorectal Cancer Screening  01/03/2030    Influenza Vaccine  Completed    Annual Depression Screening  Completed    Fall Risk Screening (Annual)  Completed    Pneumococcal Vaccine: 50+ Years  Completed    Zoster Vaccines  Completed    Meningococcal B Vaccine  Aged Out

## 2025-05-21 ENCOUNTER — LAB ENCOUNTER (OUTPATIENT)
Dept: LAB | Age: 70
End: 2025-05-21
Attending: FAMILY MEDICINE
Payer: MEDICARE

## 2025-05-21 DIAGNOSIS — R39.12 WEAK URINE STREAM: ICD-10-CM

## 2025-05-21 DIAGNOSIS — Z12.5 ENCOUNTER FOR SCREENING FOR MALIGNANT NEOPLASM OF PROSTATE: ICD-10-CM

## 2025-05-21 LAB — COMPLEXED PSA SERPL-MCNC: 0.93 NG/ML (ref ?–4)

## 2025-05-21 PROCEDURE — 36415 COLL VENOUS BLD VENIPUNCTURE: CPT

## 2025-05-21 RX ORDER — HYDROCHLOROTHIAZIDE 25 MG/1
25 TABLET ORAL DAILY
Qty: 90 TABLET | Refills: 3 | Status: SHIPPED | OUTPATIENT
Start: 2025-05-21

## (undated) DEVICE — Device: Brand: DEFENDO AIR/WATER/SUCTION AND BIOPSY VALVE

## (undated) DEVICE — LINE MNTR ADLT SET O2 INTMD

## (undated) DEVICE — TRAP MCS 40ML 5IN PLS SCR CAP

## (undated) DEVICE — FORCEP RADIAL JAW 4

## (undated) DEVICE — Device: Brand: CUSTOM PROCEDURE KIT

## (undated) DEVICE — 35 ML SYRINGE REGULAR TIP: Brand: MONOJECT

## (undated) DEVICE — SNARE CAPTIFLEX MICRO-OVL OLY

## (undated) NOTE — Clinical Note
Initial assessment completed with patient.  Appointment scheduled for 8/27/24. However, patient declines additional follow-up calls from nurse care manager.  Thank you!

## (undated) NOTE — MR AVS SNAPSHOT
[facility]  [address], [address] [facility]  [phone]               Thank you for choosing us for your health care visit with Belem Muhammad.  Lucero Calvo MD.  We are glad to serve you and happy to provide you with this summary view more details from this visit by going to [email or web address]. If you've recently had a stay at the Hospital you can access your discharge instructions in [employer] by going to Visits < Admission Summaries.  If you've been to the Emergency Depar Eat plenty of protein, keep the fat content low Sugars:  sodas and sports drinks, candies and desserts   Eat plenty of low-fat dairy products High fat meats and dairy   Choose whole grain products Foods high in sodium   Water is best for hydration Fast marsha

## (undated) NOTE — ED AVS SNAPSHOT
[facility] Emergency Department    [address]     [address] [address]    Phone:  [phone]    Fax:  Nino   MRN: [id number]    Department:  [facility] Emergency Department   Date of Visit:  3/12/2 and Class Registration line at [phone] or find a doctor online by visiting [email or web address].    IF THERE IS ANY CHANGE OR WORSENING OF YOUR CONDITION, CALL YOUR PRIMARY CARE PHYSICIAN AT ONCE OR RETURN IMMEDIATELY TO [address] [place] [address].     If

## (undated) NOTE — Clinical Note
Initial assessment completed with patient.  Pt declines SCC/PNA clinic appt--message sent to office to clarify 9/19/24 appt type. Patient declines sooner appt, declines additional follow-up calls from nurse care manager.  Thank you!  Future Appointments 9/19/2024  1:45 PM    Weiler, Colleen M, DO      [facility]

## (undated) NOTE — LETTER
10/1/2019    Meseret King        [address] [facility] [address]            Dear Meseret King,      [address] records indicate that you are due for an appointment for a Colonoscopy in December 2019, or shortly there after, with Jany Cruz MD.

## (undated) NOTE — MR AVS SNAPSHOT
[address]  [phone]               Thank you for choosing us for your health care visit with Lucio Guadalupe.  Immanuel Romero MD.  We are glad to serve you and happy to provide you with this summar Call [phone] for help. [employer] is NOT to be used for urgent needs. For medical emergencies, dial 911.            Visit [facility] online at  [employer]

## (undated) NOTE — MR AVS SNAPSHOT
[address]  [phone]               Thank you for choosing us for your health care visit with Marsha Garza.  Teresa Short MD.  We are glad to serve you and happy to provide you with this summar Visits < Visit Summaries. MyChart questions? Call [phone] for help. [employer] is NOT to be used for urgent needs. For medical emergencies, dial 911.            Visit [facility] online at  [email or web address]

## (undated) NOTE — MR AVS SNAPSHOT
[facility]  [address], [address]  [phone]               Thank you for choosing us for your health care visit with Chne Jade MD.  We are glad to serve you and happy to provide you with this summary Lump     Hyperlipidemia     Blood Pressure           Medical Issues Discussed Today     Abscess, neck    -  Primary      Instructions and Information about Your Health     None      Allergies as of Mar 06, 2017     No Known Allergies                Today' Modification Recommendation   Weight Reduction Maintain normal body weight (body mass index 18.5-24.9 kg/m2)   DASH eating plan Adopt a diet rich in fruits, vegetables, and low fat dairy products with reduced content of saturated and total fat.    Dietary s Don’t forget strength training with weights and resistance Set goals and track your progress   You don’t need to join a gym. Home exercises work great.  Put more priority on exercise in your life                    Visit [facility] online at

## (undated) NOTE — Clinical Note
Terrance Mendieta, [address]  [address]  Bebeto Henao       03/06/2017        Patient: Sweta Vo   YOB: 1955   Date of Visit: 3/6/2017       Dear  Dr. Liliana Wallace MD,      Thank you for referring Sweta Vo to my prac

## (undated) NOTE — ED AVS SNAPSHOT
[facility] Emergency Department    [address]     [address] [place] [address]    Phone:  [phone]    Fax:  Nino   MRN: [id number]    Department:  [facility] Emergency Department   Date of Visit:  3/12/2 If you have difficulty scheduling your follow-up appointment as directed, please call our  at [phone]. Si tiene problemas para programar lara gagan de seguimiento según lo indicado, llame al encargado de kenneth al [phone].     It i continue to take your medications as instructed by your Primary Care doctor until you can check with your doctor. Please bring the medication list to your next doctor's appointment.     Any imaging studies and labs completed today can be reviewed in your [place] Medicaid plans. To get signed up and covered, please call [phone] and ask to get set up for an insurance coverage that is in-network with [employer].         MyChart     Visit [employer]ME[employer]  You can access your MyChart to more actively manage

## (undated) NOTE — LETTER
[facility]  [address]  [address] [address] [phone]                01/06/20      [facility]  [address]        Dear Mary,    I reviewed the pathology report from the antonio